# Patient Record
Sex: FEMALE | URBAN - METROPOLITAN AREA
[De-identification: names, ages, dates, MRNs, and addresses within clinical notes are randomized per-mention and may not be internally consistent; named-entity substitution may affect disease eponyms.]

---

## 2018-06-26 LAB — MAMMOGRAM: NORMAL

## 2018-11-07 ENCOUNTER — OFFICE VISIT (OUTPATIENT)
Dept: DERMATOLOGY | Facility: CLINIC | Age: 63
End: 2018-11-07

## 2018-11-07 VITALS — DIASTOLIC BLOOD PRESSURE: 80 MMHG | SYSTOLIC BLOOD PRESSURE: 123 MMHG | HEART RATE: 76 BPM

## 2018-11-07 DIAGNOSIS — L30.9 DERMATITIS: Primary | ICD-10-CM

## 2018-11-07 RX ORDER — LETROZOLE 2.5 MG/1
TABLET, FILM COATED ORAL
COMMUNITY
Start: 2018-10-19

## 2018-11-07 ASSESSMENT — PAIN SCALES - GENERAL: PAINLEVEL: NO PAIN (0)

## 2018-11-07 NOTE — LETTER
11/7/2018       RE: Sahara Whiteside  [address] [facility] [address]     Dear Colleague,    Thank you for referring your patient, Sahara Whiteside, to the [facility] DERMATOLOGY at [facility]. Please see a copy of my visit note below.    [facility] Dermatology Note      Dermatology Problem List:  1. Dermatitis: dermal hypersensitivity per patient report on 3 outside biopsies (attempting to obtain prior biopsy reports). Favor methotrexate (check with patient's oncologist given history of stage III breast cancer) versus nbUVB phototherapy (patient lives in [place]); apremilast as alternative  - Current tx: cetirizine 10 mg twice daily; clobetasol ointment     CC:   Chief Complaint   Patient presents with     Derm Problem     Sahara is here for a body rash mostly on her back          Encounter Date: Nov 7, 2018    History of Present Illness:  Ms. Sahara Whiteside is a 63 year old female who presents as a referral from Dr. Aleida Arellano for evaluation of a rash on her back. This rash first manifested over ten years ago; the patient thinks that it significantly increased in severity around the time of menopause. Since then, she admits that this rash occurs episodically. It's most recent flare was around February 2018. When it flares, raised red areas appear on her skin and remain there for multiple days. These areas are very pruritic, she says. Antihistamines have not helped - another dermatologist gave her cetirizine but this had no benefit. She has had improvement with systemic steroids. She has had TRUE testing - bacitracin was identified as the only allergy. She has had three biopsies in the past; per patient report these demonstrated urticaria and dermal hypersensitivity.    As for other medical problems, the patient admits to a personal history of breast cancer. She is currently on letrozole for maintenance therapy. She has a history of MRSA.     Past  Medical History:   Breast cancer s/p chemo/radiotherapy    Social History:   reports that she has never smoked. She has never used smokeless tobacco.    Family History:  Family History   Problem Relation Age of Onset     Melanoma No family hx of      Skin Cancer No family hx of        Medications:  Current Outpatient Prescriptions   Medication Sig Dispense Refill     letrozole (FEMARA) 2.5 MG tablet        Allergies   Allergen Reactions     Bacitracin Rash     Latex Rash         Review of Systems:  - Constitutional: The patient is feeling generally well. Admits to hot flashes.   - Skin: As above in HPI. No additional skin concerns.  - MSK: no arthralgias or myalgias    Physical exam:  Vitals: /80 (BP Location: Left arm, Patient Position: Sitting, Cuff Size: Adult Regular)  Pulse 76  GEN: This is a well developed, well-nourished female in no acute distress, in a pleasant mood.    SKIN: More phototype 2  - Total skin excluding the undergarment areas was performed. The exam included the head/face, neck, both arms, chest, back, abdomen, both legs, digits and/or nails.   - On the right inframammary space there is an erythematous plaque  - On the right abdomen there are excoriated erythematous papules  - On the right upper back there are lichenified erythematous papules.   - On the lower back there are excoriated papules  - Lichenification around the right clavicle.   - No other lesions of concern on areas examined.     Impression/Plan:  1. 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 Has also trialed oral antihistamines without significant benefit. It would be helpful to review the past 3 biopsy reports - we'll contact the patient's previous providers to get these records. Given the current clinical appearance and prior response to prednisone, we would anticipate some benefit with methotrexate. We also discussed nbUVB phototherapy.  Logistically the latter would be difficult as the patient lives in [place]. Would favor methotrexate but in the context of this patient's history of breast cancer, would need to discuss with her oncologist.     Discuss initiation of methotrexate with patient's oncologist in context of breast cancer.     Discussed nbUVB with the patient - patient cannot due this at this time due to the logistical constraint of commuting out to [place] for these treatments. We discussed the potential of a home unit, but without demonstrated in-clinic effectiveness, may have coverage issues. Also discussed finding outside dermatology provider closer to patient's home who may be able to administer nbUVB.    Apremilast would be a reasonable alternative however coverage may be challenging in this patient. Will pursue if methotrexate and nbUVB are not viable options.    Restart cetirizine; may use topical steroids in the interim while awaiting oncology input    Follow-up 4-6 weeks.       Staff Involved:  Staff/Scribe    Scribe Disclosure  I, Carrillo Gonzales, am serving as a scribe to document services personally performed by Dr. Humberto Bergman MD, based on data collection and the provider's statements to me.     Provider Disclosure:   The documentation recorded by the scribe accurately reflects the services I personally performed and the decisions made by me.    Humberto Bergman MD   of Dermatology  Department of Dermatology  [facility]

## 2018-11-08 PROBLEM — L30.9 DERMATITIS: Status: ACTIVE | Noted: 2018-11-08

## 2018-11-08 NOTE — NURSING NOTE
Dermatology Rooming Note    Sahara Whiteside's goals for this visit include:   Chief Complaint   Patient presents with     Derm Problem     Sahara is here for a body rash mostly on her back      Tiny Mccartney, CMA

## 2018-11-08 NOTE — PATIENT INSTRUCTIONS
Methotrexate - ask oncologist if okay for your rash in context of breast cancer  Narrow-band UVB phototherapy - 2-3 times weekly (look on American Academy of Dermatology website - aad.org)  Cetirizine 10 mg - can take 2 in the morning and 2 at night  Clobetasol - twice daily to affected areas  Vanicream  Fax biopsy reports

## 2018-11-08 NOTE — PROGRESS NOTES
[facility] Dermatology Note      Dermatology Problem List:  1. Dermatitis: dermal hypersensitivity per patient report on 3 outside biopsies (attempting to obtain prior biopsy reports). Favor methotrexate (check with patient's oncologist given history of stage III breast cancer) versus nbUVB phototherapy (patient lives in [place]); apremilast as alternative  - Current tx: cetirizine 10 mg twice daily; clobetasol ointment     CC:   Chief Complaint   Patient presents with     Derm Problem     Sahara is here for a body rash mostly on her back          Encounter Date: Nov 7, 2018    History of Present Illness:  Ms. Sahara Whiteside is a 63 year old female who presents as a referral from Dr. Aleida Arellano for evaluation of a rash on her back. This rash first manifested over ten years ago; the patient thinks that it significantly increased in severity around the time of menopause. Since then, she admits that this rash occurs episodically. It's most recent flare was around February 2018. When it flares, raised red areas appear on her skin and remain there for multiple days. These areas are very pruritic, she says. Antihistamines have not helped - another dermatologist gave her cetirizine but this had no benefit. She has had improvement with systemic steroids. She has had TRUE testing - bacitracin was identified as the only allergy. She has had three biopsies in the past; per patient report these demonstrated urticaria and dermal hypersensitivity.    As for other medical problems, the patient admits to a personal history of breast cancer. She is currently on letrozole for maintenance therapy. She has a history of MRSA.     Past Medical History:   Breast cancer s/p chemo/radiotherapy    Social History:   reports that she has never smoked. She has never used smokeless tobacco.    Family History:  Family History   Problem Relation Age of Onset     Melanoma No family hx of      Skin Cancer No family hx of         Medications:  Current Outpatient Prescriptions   Medication Sig Dispense Refill     letrozole (FEMARA) 2.5 MG tablet        Allergies   Allergen Reactions     Bacitracin Rash     Latex Rash         Review of Systems:  - Constitutional: The patient is feeling generally well. Admits to hot flashes.   - Skin: As above in HPI. No additional skin concerns.  - MSK: no arthralgias or myalgias    Physical exam:  Vitals: /80 (BP Location: Left arm, Patient Position: Sitting, Cuff Size: Adult Regular)  Pulse 76  GEN: This is a well developed, well-nourished female in no acute distress, in a pleasant mood.    SKIN: More phototype 2  - Total skin excluding the undergarment areas was performed. The exam included the head/face, neck, both arms, chest, back, abdomen, both legs, digits and/or nails.   - On the right inframammary space there is an erythematous plaque  - On the right abdomen there are excoriated erythematous papules  - On the right upper back there are lichenified erythematous papules.   - On the lower back there are excoriated papules  - Lichenification around the right clavicle.   - No other lesions of concern on areas examined.     Impression/Plan:  1. 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 Has also trialed oral antihistamines without significant benefit. It would be helpful to review the past 3 biopsy reports - we'll contact the patient's previous providers to get these records. Given the current clinical appearance and prior response to prednisone, we would anticipate some benefit with methotrexate. We also discussed nbUVB phototherapy. Logistically the latter would be difficult as the patient lives in [place]. Would favor methotrexate but in the context of this patient's history of breast cancer, would need to discuss with her oncologist.     Discuss initiation of methotrexate with patient's oncologist in context of  breast cancer.     Discussed nbUVB with the patient - patient cannot due this at this time due to the logistical constraint of commuting out to [place] for these treatments. We discussed the potential of a home unit, but without demonstrated in-clinic effectiveness, may have coverage issues. Also discussed finding outside dermatology provider closer to patient's home who may be able to administer nbUVB.    Apremilast would be a reasonable alternative however coverage may be challenging in this patient. Will pursue if methotrexate and nbUVB are not viable options.    Restart cetirizine; may use topical steroids in the interim while awaiting oncology input    Follow-up 4-6 weeks.       Staff Involved:  Staff/Scribe    Scribe Disclosure  I, Carrillo Gonzales, am serving as a scribe to document services personally performed by Dr. Humberto Bergman MD, based on data collection and the provider's statements to me.     Provider Disclosure:   The documentation recorded by the scribe accurately reflects the services I personally performed and the decisions made by me.    Humberto Bergman MD   of Dermatology  Department of Dermatology  [facility]

## 2018-11-14 ENCOUNTER — HEALTH MAINTENANCE LETTER (OUTPATIENT)
Age: 63
End: 2018-11-14

## 2018-11-16 ENCOUNTER — MYC MEDICAL ADVICE (OUTPATIENT)
Dept: DERMATOLOGY | Facility: CLINIC | Age: 63
End: 2018-11-16

## 2018-11-16 DIAGNOSIS — L30.9 DERMATITIS: Primary | ICD-10-CM

## 2018-11-18 RX ORDER — METHOTREXATE 2.5 MG/1
TABLET ORAL
Qty: 24 TABLET | Refills: 3 | Status: SHIPPED | OUTPATIENT
Start: 2018-11-18 | End: 2019-04-29

## 2018-11-18 NOTE — TELEPHONE ENCOUNTER
Plan to start methotrexate given okay by patient's oncologist. Will obtain baseline labs, then 5 mg test dose, then repeat CBC in 1 week. If okay, increase to 10 mg with repeat CBC in 1 week. If okay, increase to 15 mg with repeat CBC in 1 week, then monthly labs until 3 months, then q3 months thereafter.

## 2018-11-19 ENCOUNTER — TRANSFERRED RECORDS (OUTPATIENT)
Dept: HEALTH INFORMATION MANAGEMENT | Facility: CLINIC | Age: 63
End: 2018-11-19

## 2018-11-26 ENCOUNTER — TELEPHONE (OUTPATIENT)
Dept: DERMATOLOGY | Facility: CLINIC | Age: 63
End: 2018-11-26

## 2018-11-26 NOTE — TELEPHONE ENCOUNTER
Outside labs received and reviewed by Dr. Bergman, labs WNL and gita to increase dose per provider.

## 2018-12-05 ENCOUNTER — OFFICE VISIT (OUTPATIENT)
Dept: DERMATOLOGY | Facility: CLINIC | Age: 63
End: 2018-12-05

## 2018-12-05 VITALS — HEART RATE: 74 BPM | SYSTOLIC BLOOD PRESSURE: 115 MMHG | DIASTOLIC BLOOD PRESSURE: 63 MMHG

## 2018-12-05 DIAGNOSIS — L30.9 DERMATITIS: Primary | ICD-10-CM

## 2018-12-05 DIAGNOSIS — L30.9 DERMATITIS: ICD-10-CM

## 2018-12-05 LAB
ALBUMIN SERPL-MCNC: 3.9 G/DL (ref 3.4–5)
ALP SERPL-CCNC: 104 U/L (ref 40–150)
ALT SERPL W P-5'-P-CCNC: 32 U/L (ref 0–50)
ANION GAP SERPL CALCULATED.3IONS-SCNC: 9 MMOL/L (ref 3–14)
AST SERPL W P-5'-P-CCNC: 32 U/L (ref 0–45)
BASOPHILS # BLD AUTO: 0 10E9/L (ref 0–0.2)
BASOPHILS NFR BLD AUTO: 0.5 %
BILIRUB SERPL-MCNC: 0.9 MG/DL (ref 0.2–1.3)
BUN SERPL-MCNC: 22 MG/DL (ref 7–30)
CALCIUM SERPL-MCNC: 9.2 MG/DL (ref 8.5–10.1)
CHLORIDE SERPL-SCNC: 107 MMOL/L (ref 94–109)
CO2 SERPL-SCNC: 27 MMOL/L (ref 20–32)
CREAT SERPL-MCNC: 0.65 MG/DL (ref 0.52–1.04)
DIFFERENTIAL METHOD BLD: NORMAL
EOSINOPHIL # BLD AUTO: 0.3 10E9/L (ref 0–0.7)
EOSINOPHIL NFR BLD AUTO: 3.9 %
ERYTHROCYTE [DISTWIDTH] IN BLOOD BY AUTOMATED COUNT: 13 % (ref 10–15)
GFR SERPL CREATININE-BSD FRML MDRD: >90 ML/MIN/1.7M2
GLUCOSE SERPL-MCNC: 94 MG/DL (ref 70–99)
HCT VFR BLD AUTO: 41.7 % (ref 35–47)
HGB BLD-MCNC: 13.4 G/DL (ref 11.7–15.7)
IMM GRANULOCYTES # BLD: 0 10E9/L (ref 0–0.4)
IMM GRANULOCYTES NFR BLD: 0.3 %
LYMPHOCYTES # BLD AUTO: 1.4 10E9/L (ref 0.8–5.3)
LYMPHOCYTES NFR BLD AUTO: 17.3 %
MCH RBC QN AUTO: 30.9 PG (ref 26.5–33)
MCHC RBC AUTO-ENTMCNC: 32.1 G/DL (ref 31.5–36.5)
MCV RBC AUTO: 96 FL (ref 78–100)
MONOCYTES # BLD AUTO: 0.6 10E9/L (ref 0–1.3)
MONOCYTES NFR BLD AUTO: 7.5 %
NEUTROPHILS # BLD AUTO: 5.6 10E9/L (ref 1.6–8.3)
NEUTROPHILS NFR BLD AUTO: 70.5 %
NRBC # BLD AUTO: 0 10*3/UL
NRBC BLD AUTO-RTO: 0 /100
PLATELET # BLD AUTO: 294 10E9/L (ref 150–450)
POTASSIUM SERPL-SCNC: 4.3 MMOL/L (ref 3.4–5.3)
PROT SERPL-MCNC: 7.8 G/DL (ref 6.8–8.8)
RBC # BLD AUTO: 4.34 10E12/L (ref 3.8–5.2)
SODIUM SERPL-SCNC: 142 MMOL/L (ref 133–144)
WBC # BLD AUTO: 7.9 10E9/L (ref 4–11)

## 2018-12-05 ASSESSMENT — PAIN SCALES - GENERAL: PAINLEVEL: NO PAIN (0)

## 2018-12-05 NOTE — LETTER
12/5/2018       RE: Sahara Whiteside  [address] [facility] [address]     Dear Colleague,    Thank you for referring your patient, Sahara Whiteside, to the [facility] DERMATOLOGY at [facility]. Please see a copy of my visit note below.    [facility] Dermatology Note      Dermatology Problem List:  1. Dermatitis NOS: current treatment: cetirizine 10 mg twice daily; methotrexate 15 mg weekly; clobetasol ointment; home UVB unit (bought OTC from [employer])     Encounter Date: Dec 5, 2018    CC:  No chief complaint on file.        History of Present Illness:  Ms. Sahara Whiteside is a 63 year old female who presents as a follow-up for dermatitis. The patient was last seen 11/07/18 when she started methotrexate and restarted cetirizine. Today she reports that she has started taking methotrexate and took four pills this past Saturday (12/1/18), and she has not noticed any relief thus far. She voices concern about liver damage from methotrexate, and she states that she purchased a nbUVB lamp from [employer] that has been providing some improvement but incomplete relief of symptoms - she still notes diffuse itching and red rashes on the trunk. The patient voices no other concerns.      Past Medical History:   Patient Active Problem List   Diagnosis     Dermatitis     No past medical history on file.  No past surgical history on file.    Social History:  Patient reports that she has never smoked. She has never used smokeless tobacco.    Family History:  Family History   Problem Relation Age of Onset     Melanoma No family hx of      Skin Cancer No family hx of        Medications:  Current Outpatient Prescriptions   Medication Sig Dispense Refill     letrozole (FEMARA) 2.5 MG tablet        methotrexate sodium 2.5 MG TABS Take 2 tabs once then check labs. If ok, increase to 4 tabs 1 wk later then check labs, if ok increase to 6 tabs 1 wk later then wkly after 24 tablet 3        Allergies   Allergen Reactions     Bacitracin Rash     Latex Rash       Review of Systems:  -As per HPI  -Constitutional: Otherwise feeling well today, in usual state of health.  -HEENT: Patient denies nonhealing oral sores.  -Skin: As above in HPI. No additional skin concerns.    Physical exam:  Vitals: /63 (BP Location: Left arm, Patient Position: Chair)  Pulse 74  GEN: This is a well developed, well-nourished female in no acute distress, in a pleasant mood.    SKIN: Total skin excluding the undergarment areas was performed. The exam included the head/face, neck, both arms, chest, back, abdomen, both legs, digits and/or nails.   -Neymar type 2  -excoriated papules on the lower and upper back   -erythema and lichenification on the lower back and flanks   -erythema and lichenification on upper chest and inframammary space  -No other lesions of concern on areas examined.       Impression/Plan:  1. Eczematous dermatitis: currently using OTC UVB lamps; recently uptitrated to full dose methotrexate but too early to assess clinical response. Extensively discussed treatment options including switching to medical nbUVB rather than using OTC units (unclear dosing, risk of burning, etc) versus continuation of methotrexate. Patient is concerned about potential side effects of methotrexate, but no dermatologists nearby have nbUVB. Discussed calling local dermatology offices near home to see if there are available nbUVB units, then switching to prescription home units in the future, versus continuing methotrexate. At this time, patient prefers to trial short course of methotrexate but will also look into nearby nbUVB treatments.    Labs today: CMP, CBC w/platelet differential      CC Aleida Arellano MD  [facility]  [address] [place] [address] on close of this encounter.  Follow-up in 2 to 3 months, earlier for new or changing lesions.       Staff Involved:  Scribe/Staff    Scribe  Disclosure  I, Carrillo Najjar, am serving as a scribe to document services personally performed by Dr. Humberto Bergman MD, based on data collection and the provider's statements to me.    Over 25 minutes was spent during this visit, including >20 minutes of face-to-face time with the patient counseling and coordinating care including the discussion of diagnosis, natural history, treatment, and prognosis as documented above.    Provider Disclosure:   The documentation recorded by the scribe accurately reflects the services I personally performed and the decisions made by me.    Humberto Bergman MD   of Dermatology  Department of Dermatology  [facility]

## 2018-12-05 NOTE — MR AVS SNAPSHOT
After Visit Summary   12/5/2018    Sahara Whiteside    MRN: [id number]           Patient Information     Date Of Birth          1955        Visit Information        Provider Department      12/5/2018 2:00 PM Humberto Bergman MD [facility] Dermatology        Today's Diagnoses     Dermatitis    -  1       Follow-ups after your visit        Your next 10 appointments already scheduled     Dec 05, 2018  2:30 PM CST   LAB with  LAB   [facility]    [address] [place] [id number] [phone]           Please do not eat 10-12 hours before your appointment if you are coming in fasting for labs on lipids, cholesterol, or glucose (sugar). This does not apply to pregnant women. Water, hot tea and black coffee (with nothing added) are okay. Do not drink other fluids, diet soda or chew gum.            Mar 11, 2019 11:05 AM CDT   (Arrive by 10:50 AM)   Return Visit with Humberto Bergman MD   [facility]    [address] [place] [id number] [phone]              Future tests that were ordered for you today     Open Standing Orders        Priority Remaining Interval Expires Ordered    CBC with platelets differential Routine 5/5 1 WEEK 12/5/2019 12/5/2018    Comprehensive metabolic panel Routine 5/5 1 week 12/5/2019 12/5/2018            Who to contact     Please call your clinic at [phone] to:    Ask questions about your health    Make or cancel appointments    Discuss your medicines    Learn about your test results    Speak to your doctor            Additional Information About Your Visit        MyChart Information     [facility] gives you secure access to your electronic health record. If you see a primary care provider, you can also send messages to your care team and make appointments. If you have questions, please call your primary care clinic.  If you do not  have a primary care provider, please call [phone] and they will assist you.      [address] is an electronic gateway that provides easy, online access to your medical records. With [address], you can request a clinic appointment, read your test results, renew a prescription or communicate with your care team.     To access your existing account, please contact your [facility] or call [phone] for assistance.        Care EveryWhere ID     This is your Care EveryWhere ID. This could be used by other organizations to access your [facility] medical records  [id number]        Your Vitals Were     Pulse                   74            Blood Pressure from Last 3 Encounters:   12/05/18 115/63   11/07/18 123/80    Weight from Last 3 Encounters:   No data found for Wt               Primary Care Provider Office Phone # Fax #    Aleida Arellano [phone]       [facility] [address] [facility] [address]        Equal Access to Services     DIANNE CARSON : Hadii aad ku hadasho Soomaali, waaxda luqadaha, qaybta kaalmada adeegyada, waxay idiin hayaan terrell elena . So [facility] [phone].    ATENCIÓN: Si habla español, tiene a fierro disposición servicios gratuitos de asistencia lingüística. Llame al [phone].    We comply with applicable federal civil rights laws and [place] laws. We do not discriminate on the basis of race, color, national origin, age, disability, sex, sexual orientation, or gender identity.            Thank you!     Thank you for choosing [facility] DERMATOLOGY  for your care. Our goal is always to provide you with excellent care. Hearing back from our patients is one way we can continue to improve our services. Please take a few minutes to complete the written survey that you may receive in the mail after your visit with us. Thank you!             Your Updated Medication List - Protect others around you: Learn how to safely use,  store and throw away your medicines at www.disposemymeds.org.          This list is accurate as of 12/5/18  2:16 PM.  Always use your most recent med list.                   Brand Name Dispense Instructions for use Diagnosis    letrozole 2.5 MG tablet    FEMARA          methotrexate sodium 2.5 MG Tabs     24 tablet    Take 2 tabs once then check labs. If ok, increase to 4 tabs 1 wk later then check labs, if ok increase to 6 tabs 1 wk later then wkly after    Dermatitis

## 2018-12-05 NOTE — PROGRESS NOTES
[facility] Dermatology Note      Dermatology Problem List:  1. Dermatitis NOS: current treatment: cetirizine 10 mg twice daily; methotrexate 15 mg weekly; clobetasol ointment; home UVB unit (bought OTC from [employer])     Encounter Date: Dec 5, 2018    CC:  No chief complaint on file.        History of Present Illness:  Ms. Sahara Whiteside is a 63 year old female who presents as a follow-up for dermatitis. The patient was last seen 11/07/18 when she started methotrexate and restarted cetirizine. Today she reports that she has started taking methotrexate and took four pills this past Saturday (12/1/18), and she has not noticed any relief thus far. She voices concern about liver damage from methotrexate, and she states that she purchased a nbUVB lamp from [employer] that has been providing some improvement but incomplete relief of symptoms - she still notes diffuse itching and red rashes on the trunk. The patient voices no other concerns.      Past Medical History:   Patient Active Problem List   Diagnosis     Dermatitis     No past medical history on file.  No past surgical history on file.    Social History:  Patient reports that she has never smoked. She has never used smokeless tobacco.    Family History:  Family History   Problem Relation Age of Onset     Melanoma No family hx of      Skin Cancer No family hx of        Medications:  Current Outpatient Prescriptions   Medication Sig Dispense Refill     letrozole (FEMARA) 2.5 MG tablet        methotrexate sodium 2.5 MG TABS Take 2 tabs once then check labs. If ok, increase to 4 tabs 1 wk later then check labs, if ok increase to 6 tabs 1 wk later then wkly after 24 tablet 3       Allergies   Allergen Reactions     Bacitracin Rash     Latex Rash       Review of Systems:  -As per HPI  -Constitutional: Otherwise feeling well today, in usual state of health.  -HEENT: Patient denies nonhealing oral sores.  -Skin: As above in HPI. No additional skin  concerns.    Physical exam:  Vitals: /63 (BP Location: Left arm, Patient Position: Chair)  Pulse 74  GEN: This is a well developed, well-nourished female in no acute distress, in a pleasant mood.    SKIN: Total skin excluding the undergarment areas was performed. The exam included the head/face, neck, both arms, chest, back, abdomen, both legs, digits and/or nails.   -Neymar type 2  -excoriated papules on the lower and upper back   -erythema and lichenification on the lower back and flanks   -erythema and lichenification on upper chest and inframammary space  -No other lesions of concern on areas examined.       Impression/Plan:  1. Eczematous dermatitis: currently using OTC UVB lamps; recently uptitrated to full dose methotrexate but too early to assess clinical response. Extensively discussed treatment options including switching to medical nbUVB rather than using OTC units (unclear dosing, risk of burning, etc) versus continuation of methotrexate. Patient is concerned about potential side effects of methotrexate, but no dermatologists nearby have nbUVB. Discussed calling local dermatology offices near home to see if there are available nbUVB units, then switching to prescription home units in the future, versus continuing methotrexate. At this time, patient prefers to trial short course of methotrexate but will also look into nearby nbUVB treatments.    Labs today: CMP, CBC w/platelet differential      CC Aleida Arellano MD  [facility]  [address] [place] [address] on close of this encounter.  Follow-up in 2 to 3 months, earlier for new or changing lesions.       Staff Involved:  Scribe/Staff    Scribe Disclosure  I, Dominic Najjar, am serving as a scribe to document services personally performed by Dr. Humberto Bergman MD, based on data collection and the provider's statements to me.    Over 25 minutes was spent during this visit, including >20 minutes of face-to-face time with the  patient counseling and coordinating care including the discussion of diagnosis, natural history, treatment, and prognosis as documented above.    Provider Disclosure:   The documentation recorded by the scribe accurately reflects the services I personally performed and the decisions made by me.    Humberto Bergman MD   of Dermatology  Department of Dermatology  [facility]

## 2018-12-06 LAB
HBV CORE AB SERPL QL IA: NONREACTIVE
HBV SURFACE AB SERPL IA-ACNC: >1000 M[IU]/ML
HBV SURFACE AG SERPL QL IA: NONREACTIVE
HCV AB SERPL QL IA: NONREACTIVE

## 2018-12-14 DIAGNOSIS — L30.9 DERMATITIS: ICD-10-CM

## 2018-12-14 LAB
BASOPHILS # BLD AUTO: 0 10E9/L (ref 0–0.2)
BASOPHILS NFR BLD AUTO: 0.4 %
DIFFERENTIAL METHOD BLD: NORMAL
EOSINOPHIL # BLD AUTO: 0.2 10E9/L (ref 0–0.7)
EOSINOPHIL NFR BLD AUTO: 2.8 %
ERYTHROCYTE [DISTWIDTH] IN BLOOD BY AUTOMATED COUNT: 13.7 % (ref 10–15)
HCT VFR BLD AUTO: 40.7 % (ref 35–47)
HGB BLD-MCNC: 13.3 G/DL (ref 11.7–15.7)
LYMPHOCYTES # BLD AUTO: 1.2 10E9/L (ref 0.8–5.3)
LYMPHOCYTES NFR BLD AUTO: 14.7 %
MCH RBC QN AUTO: 31.4 PG (ref 26.5–33)
MCHC RBC AUTO-ENTMCNC: 32.7 G/DL (ref 31.5–36.5)
MCV RBC AUTO: 96 FL (ref 78–100)
MONOCYTES # BLD AUTO: 0.7 10E9/L (ref 0–1.3)
MONOCYTES NFR BLD AUTO: 7.9 %
NEUTROPHILS # BLD AUTO: 6.1 10E9/L (ref 1.6–8.3)
NEUTROPHILS NFR BLD AUTO: 74.2 %
PLATELET # BLD AUTO: 278 10E9/L (ref 150–450)
RBC # BLD AUTO: 4.23 10E12/L (ref 3.8–5.2)
WBC # BLD AUTO: 8.2 10E9/L (ref 4–11)

## 2018-12-14 PROCEDURE — 80053 COMPREHEN METABOLIC PANEL: CPT | Performed by: DERMATOLOGY

## 2018-12-14 PROCEDURE — 85025 COMPLETE CBC W/AUTO DIFF WBC: CPT | Performed by: DERMATOLOGY

## 2018-12-14 PROCEDURE — 36415 COLL VENOUS BLD VENIPUNCTURE: CPT | Performed by: DERMATOLOGY

## 2018-12-15 LAB
ALBUMIN SERPL-MCNC: 3.8 G/DL (ref 3.4–5)
ALP SERPL-CCNC: 95 U/L (ref 40–150)
ALT SERPL W P-5'-P-CCNC: 32 U/L (ref 0–50)
ANION GAP SERPL CALCULATED.3IONS-SCNC: 9 MMOL/L (ref 3–14)
AST SERPL W P-5'-P-CCNC: 29 U/L (ref 0–45)
BILIRUB SERPL-MCNC: 0.7 MG/DL (ref 0.2–1.3)
BUN SERPL-MCNC: 32 MG/DL (ref 7–30)
CALCIUM SERPL-MCNC: 9.3 MG/DL (ref 8.5–10.1)
CHLORIDE SERPL-SCNC: 105 MMOL/L (ref 94–109)
CO2 SERPL-SCNC: 27 MMOL/L (ref 20–32)
CREAT SERPL-MCNC: 0.65 MG/DL (ref 0.52–1.04)
GFR SERPL CREATININE-BSD FRML MDRD: >90 ML/MIN/1.7M2
GLUCOSE SERPL-MCNC: 98 MG/DL (ref 70–99)
POTASSIUM SERPL-SCNC: 4.2 MMOL/L (ref 3.4–5.3)
PROT SERPL-MCNC: 7.3 G/DL (ref 6.8–8.8)
SODIUM SERPL-SCNC: 141 MMOL/L (ref 133–144)

## 2019-02-18 ENCOUNTER — DOCUMENTATION ONLY (OUTPATIENT)
Dept: CARE COORDINATION | Facility: CLINIC | Age: 64
End: 2019-02-18

## 2019-04-01 ENCOUNTER — MYC REFILL (OUTPATIENT)
Dept: DERMATOLOGY | Facility: CLINIC | Age: 64
End: 2019-04-01

## 2019-04-01 DIAGNOSIS — Z51.81 MEDICATION MONITORING ENCOUNTER: Primary | ICD-10-CM

## 2019-04-01 DIAGNOSIS — L30.9 DERMATITIS: ICD-10-CM

## 2019-04-01 NOTE — TELEPHONE ENCOUNTER
methotrexate sodium 2.5 MG TABS       Last Written Prescription Date:  11-18-18  Last Fill Quantity: 24,   # refills: 3  Last Office Visit: 12-5-18  Future Office visit:  None  Scheduling has been notified to contact the pt for appointment.      CBC RESULTS:   Recent Labs   Lab Test 12/14/18  1415   WBC 8.2   RBC 4.23   HGB 13.3   HCT 40.7   MCV 96   MCH 31.4   MCHC 32.7   RDW 13.7          Creatinine   Date Value Ref Range Status   12/14/2018 0.65 0.52 - 1.04 mg/dL Final   ]    Liver Function Studies -   Recent Labs   Lab Test 12/14/18  1415   PROTTOTAL 7.3   ALBUMIN 3.8   BILITOTAL 0.7   ALKPHOS 95   AST 29   ALT 32       Kathleen M Doege RN

## 2019-04-02 RX ORDER — METHOTREXATE 2.5 MG/1
15 TABLET ORAL WEEKLY
Qty: 70 TABLET | Refills: 3 | OUTPATIENT
Start: 2019-04-02 | End: 2019-06-19

## 2019-04-03 NOTE — TELEPHONE ENCOUNTER
Labs were reviewed in our system and CareEverywhere and have not been done in several months. I have placed orders for CBC and CMP which should be completed prior to refill authorization. Please notify pt.     Becka Valenzuela MD  Dermatology Resident, PGY-3  Pager [phone]  RYAN

## 2019-04-03 NOTE — TELEPHONE ENCOUNTER
I called the patient and she was notified that she needed to have labs done before we could refill her medication. She will go to the [facility] in [place] to have these drawn.   Jackeline Gardner, RUFUS

## 2019-04-08 DIAGNOSIS — L30.9 DERMATITIS: ICD-10-CM

## 2019-04-08 LAB
ALBUMIN SERPL-MCNC: 3.8 G/DL (ref 3.4–5)
ALP SERPL-CCNC: 99 U/L (ref 40–150)
ALT SERPL W P-5'-P-CCNC: 23 U/L (ref 0–50)
ANION GAP SERPL CALCULATED.3IONS-SCNC: 10 MMOL/L (ref 3–14)
AST SERPL W P-5'-P-CCNC: 27 U/L (ref 0–45)
BASOPHILS # BLD AUTO: 0 10E9/L (ref 0–0.2)
BASOPHILS NFR BLD AUTO: 0.8 %
BILIRUB SERPL-MCNC: 1.2 MG/DL (ref 0.2–1.3)
BUN SERPL-MCNC: 25 MG/DL (ref 7–30)
CALCIUM SERPL-MCNC: 9.2 MG/DL (ref 8.5–10.1)
CHLORIDE SERPL-SCNC: 109 MMOL/L (ref 94–109)
CO2 SERPL-SCNC: 24 MMOL/L (ref 20–32)
CREAT SERPL-MCNC: 0.72 MG/DL (ref 0.52–1.04)
DIFFERENTIAL METHOD BLD: NORMAL
EOSINOPHIL # BLD AUTO: 0.2 10E9/L (ref 0–0.7)
EOSINOPHIL NFR BLD AUTO: 3.3 %
ERYTHROCYTE [DISTWIDTH] IN BLOOD BY AUTOMATED COUNT: 13.1 % (ref 10–15)
GFR SERPL CREATININE-BSD FRML MDRD: 89 ML/MIN/{1.73_M2}
GLUCOSE SERPL-MCNC: 94 MG/DL (ref 70–99)
HCT VFR BLD AUTO: 41.7 % (ref 35–47)
HGB BLD-MCNC: 13.9 G/DL (ref 11.7–15.7)
LYMPHOCYTES # BLD AUTO: 1.3 10E9/L (ref 0.8–5.3)
LYMPHOCYTES NFR BLD AUTO: 25.2 %
MCH RBC QN AUTO: 32.2 PG (ref 26.5–33)
MCHC RBC AUTO-ENTMCNC: 33.3 G/DL (ref 31.5–36.5)
MCV RBC AUTO: 97 FL (ref 78–100)
MONOCYTES # BLD AUTO: 0.4 10E9/L (ref 0–1.3)
MONOCYTES NFR BLD AUTO: 8.4 %
NEUTROPHILS # BLD AUTO: 3.2 10E9/L (ref 1.6–8.3)
NEUTROPHILS NFR BLD AUTO: 62.3 %
PLATELET # BLD AUTO: 270 10E9/L (ref 150–450)
POTASSIUM SERPL-SCNC: 4.1 MMOL/L (ref 3.4–5.3)
PROT SERPL-MCNC: 7.3 G/DL (ref 6.8–8.8)
RBC # BLD AUTO: 4.32 10E12/L (ref 3.8–5.2)
SODIUM SERPL-SCNC: 143 MMOL/L (ref 133–144)
WBC # BLD AUTO: 5.1 10E9/L (ref 4–11)

## 2019-04-08 PROCEDURE — 85025 COMPLETE CBC W/AUTO DIFF WBC: CPT | Performed by: DERMATOLOGY

## 2019-04-08 PROCEDURE — 36415 COLL VENOUS BLD VENIPUNCTURE: CPT | Performed by: DERMATOLOGY

## 2019-04-08 PROCEDURE — 80053 COMPREHEN METABOLIC PANEL: CPT | Performed by: DERMATOLOGY

## 2019-04-29 ENCOUNTER — OFFICE VISIT (OUTPATIENT)
Dept: DERMATOLOGY | Facility: CLINIC | Age: 64
End: 2019-04-29
Payer: COMMERCIAL

## 2019-04-29 ENCOUNTER — TELEPHONE (OUTPATIENT)
Dept: DERMATOLOGY | Facility: CLINIC | Age: 64
End: 2019-04-29

## 2019-04-29 VITALS — SYSTOLIC BLOOD PRESSURE: 112 MMHG | HEART RATE: 80 BPM | DIASTOLIC BLOOD PRESSURE: 69 MMHG

## 2019-04-29 DIAGNOSIS — L30.9 DERMATITIS: ICD-10-CM

## 2019-04-29 DIAGNOSIS — L30.9 DERMATITIS: Primary | ICD-10-CM

## 2019-04-29 DIAGNOSIS — Z79.899 ENCOUNTER FOR LONG-TERM (CURRENT) USE OF HIGH-RISK MEDICATION: ICD-10-CM

## 2019-04-29 RX ORDER — METHOTREXATE 2.5 MG/1
12.5 TABLET ORAL WEEKLY
Qty: 25 TABLET | Refills: 3 | Status: SHIPPED | OUTPATIENT
Start: 2019-04-29 | End: 2019-07-10

## 2019-04-29 RX ORDER — METHOTREXATE 2.5 MG/1
12.5 TABLET ORAL WEEKLY
Qty: 24 TABLET | Refills: 3 | Status: SHIPPED | OUTPATIENT
Start: 2019-04-29 | End: 2019-04-29

## 2019-04-29 ASSESSMENT — PAIN SCALES - GENERAL: PAINLEVEL: NO PAIN (0)

## 2019-04-29 NOTE — TELEPHONE ENCOUNTER
[facility] Call Center    Phone Message    May a detailed message be left on voicemail: yes    Reason for Call: Medication Question or concern regarding medication   Prescription Clarification  Name of Medication: methotrexate sodium 2.5 MG TABS  Prescribing Provider: Dr. Bergman   Pharmacy: [facility] [address]     What on the order needs clarification? Quantity needs to be changed to multiple of 5, as pt's dose was increased from 4 to 5 tabs. Quantity on order is 24, verbal is ok.          Action Taken: Message routed to:  [facility] ([facility] Derm

## 2019-04-29 NOTE — NURSING NOTE
Dermatology Rooming Note    Sahara Whiteside's goals for this visit include:   Chief Complaint   Patient presents with     Derm Problem     Sahara is here today for a follow up on Eczematous dermatitis. She says it is great.     Felisha Linares CMA

## 2019-04-29 NOTE — PROGRESS NOTES
[facility] Dermatology Note      Dermatology Problem List:  1. Eczematous/urticaria dermatitis: much improved with methotrexate; recurred upon discontinuation  Current tx: methotrexate 12.5 mg every 10 days  Previous tx: cetirizine 10 mg twice daily; clobetasol ointment; home nbUVB unit       Encounter Date: Apr 29, 2019    CC:  Chief Complaint   Patient presents with     Derm Problem     Sahara is here today for a follow up on Eczematous dermatitis. She says it is great.         History of Present Illness:  Ms. Sahara Whiteside is a 63 year old female who presents as a follow-up for eczematous/urticaria dermatitis. The patient was last seen 12/5/19 when methotrexate was initiated. She is currently taking methotrexate 15 mg every 10 days. She has had complete resolution of her red rash without any residual itching. She tried coming off of the methotrexate briefly, but the rash came back almost immediately. She is curious about what the lowest dosage could be to keep her rash under control.     Past Medical History:   Patient Active Problem List   Diagnosis     Dermatitis     No past medical history on file.  No past surgical history on file.    Social History:  Patient reports that she has never smoked. She has never used smokeless tobacco.    Family History:  Family History   Problem Relation Age of Onset     Melanoma No family hx of      Skin Cancer No family hx of        Medications:  Current Outpatient Medications   Medication Sig Dispense Refill     letrozole (FEMARA) 2.5 MG tablet        methotrexate sodium 2.5 MG TABS Take 2 tabs once then check labs. If ok, increase to 4 tabs 1 wk later then check labs, if ok increase to 6 tabs 1 wk later then wkly after (Patient not taking: Reported on 4/29/2019) 24 tablet 3     Allergies   Allergen Reactions     Bacitracin Rash     Latex Rash         Review of Systems:  -Constitutional: Otherwise feeling well today, in usual state of health. No fever,  chills, or headache.   -Skin: As above in HPI. No additional skin concerns.    Physical exam:  Vitals: /69 (BP Location: Left arm, Patient Position: Sitting, Cuff Size: Adult Regular)   Pulse 80   GEN: This is a well developed, well-nourished female in no acute distress, in a pleasant mood.    SKIN: Waist-up skin, which includes the head/face, neck, both arms, chest, back, abdomen, digits and/or nails was examined.  -Scar right upper chest and mid central chest.  -No erythema, scaling, excoriations, or lichenification noted.  -No other lesions of concern on areas examined.     Impression/Plan:  1. Eczematous/urticarial dermatitis-currently well controlled with methotrexate. We discussed a slow taper of the methotrexate to see how she responds.     Decrease methotrexate to 12.5 mg every 10 days for 6 weeks, then decrease to 10 mg for 6 weeks, then 7.5 mg for six weeks.    Return to clinic if rash returns prior to next check up.    Labs due in 3 months.      CC [place] [address] on close of this encounter.  Follow-up in 3-4 months, earlier for new or changing lesions.     Staff Involved:  I,Anamika Hurley, MS4, saw and examined the patient in the presence of Dr. Madalyn MD.    Staff Physician:  I was present with the medical student who participated in the service and in the documentation of the note. I have verified the history and personally performed the physical exam and medical decision making. I edited the assessment and plan of care as documented in the note.     Humberto Bergman MD   of Dermatology  Department of Dermatology  [facility]

## 2019-04-29 NOTE — LETTER
4/29/2019       RE: Sahara Whiteside  [address] [address]     Dear Colleague,    Thank you for referring your patient, Sahara Whiteside, to the [facility] DERMATOLOGY at [facility]. Please see a copy of my visit note below.    [facility] Dermatology Note      Dermatology Problem List:  1.  Eczematous/urticaria dermatitis: much improved with methotrexate; recurred upon discontinuation  Current tx: methotrexate 12.5 mg every 10 days  Previous tx: cetirizine 10 mg twice daily; clobetasol ointment; home nbUVB unit       Encounter Date: Apr 29, 2019    CC:  Chief Complaint   Patient presents with     Derm Problem     Sahara is here today for a follow up on Eczematous dermatitis. She says it is great.         History of Present Illness:  Ms. Sahara Whiteside is a 63 year old female who presents as a follow-up for eczematous/urticaria dermatitis. The patient was last seen 12/5/19 when methotrexate was initiated. She is currently taking methotrexate 15 mg every 10 days. She has had complete resolution of her red rash without any residual itching. She tried coming off of the methotrexate briefly, but the rash came back almost immediately. She is curious about what the lowest dosage could be to keep her rash under control.     Past Medical History:   Patient Active Problem List   Diagnosis     Dermatitis     No past medical history on file.  No past surgical history on file.    Social History:  Patient reports that she has never smoked. She has never used smokeless tobacco.    Family History:  Family History   Problem Relation Age of Onset     Melanoma No family hx of      Skin Cancer No family hx of        Medications:  Current Outpatient Medications   Medication Sig Dispense Refill     letrozole (FEMARA) 2.5 MG tablet        methotrexate sodium 2.5 MG TABS Take 2 tabs once then check labs. If ok, increase to 4 tabs 1 wk later then check labs, if ok  increase to 6 tabs 1 wk later then wkly after (Patient not taking: Reported on 4/29/2019) 24 tablet 3     Allergies   Allergen Reactions     Bacitracin Rash     Latex Rash         Review of Systems:  -Constitutional: Otherwise feeling well today, in usual state of health. No fever, chills, or headache.   -Skin: As above in HPI. No additional skin concerns.    Physical exam:  Vitals: /69 (BP Location: Left arm, Patient Position: Sitting, Cuff Size: Adult Regular)   Pulse 80   GEN: This is a well developed, well-nourished female in no acute distress, in a pleasant mood.    SKIN: Waist-up skin, which includes the head/face, neck, both arms, chest, back, abdomen, digits and/or nails was examined.  -Scar right upper chest and mid central chest.  -No erythema, scaling, excoriations, or lichenification noted.  -No other lesions of concern on areas examined.     Impression/Plan:  1. Eczematous/urticarial dermatitis-currently well controlled with methotrexate. We discussed a slow taper of the methotrexate to see how she responds.     Decrease methotrexate to 12.5 mg every 10 days for 6 weeks, then decrease to 10 mg for 6 weeks, then 7.5 mg for six weeks.    Return to clinic if rash returns prior to next check up.    Labs due in 3 months.      CC Aleida Arellano MD  [facility]  [address] [place] [address] on close of this encounter.  Follow-up in 3-4 months, earlier for new or changing lesions.     Staff Involved:  I,Anamika Hurley, MS4, saw and examined the patient in the presence of Dr. Madalyn MD.    Staff Physician:  I was present with the medical student who participated in the service and in the documentation of the note. I have verified the history and personally performed the physical exam and medical decision making. I edited the assessment and plan of care as documented in the note.       Again, thank you for allowing me to participate in the care of your patient.       Sincerely,    Humberto Bergman MD

## 2019-07-10 DIAGNOSIS — L30.9 DERMATITIS: Primary | ICD-10-CM

## 2019-07-10 RX ORDER — METHOTREXATE 2.5 MG/1
15 TABLET ORAL WEEKLY
Qty: 30 TABLET | Refills: 3 | Status: SHIPPED | OUTPATIENT
Start: 2019-07-10 | End: 2020-02-26

## 2019-07-22 DIAGNOSIS — Z79.899 ENCOUNTER FOR LONG-TERM (CURRENT) USE OF HIGH-RISK MEDICATION: ICD-10-CM

## 2019-07-22 LAB
ALBUMIN SERPL-MCNC: 3.7 G/DL (ref 3.4–5)
ALP SERPL-CCNC: 85 U/L (ref 40–150)
ALT SERPL W P-5'-P-CCNC: 27 U/L (ref 0–50)
ANION GAP SERPL CALCULATED.3IONS-SCNC: 10 MMOL/L (ref 3–14)
AST SERPL W P-5'-P-CCNC: 28 U/L (ref 0–45)
BASOPHILS # BLD AUTO: 0 10E9/L (ref 0–0.2)
BASOPHILS NFR BLD AUTO: 0.3 %
BILIRUB SERPL-MCNC: 1 MG/DL (ref 0.2–1.3)
BUN SERPL-MCNC: 23 MG/DL (ref 7–30)
CALCIUM SERPL-MCNC: 8.8 MG/DL (ref 8.5–10.1)
CHLORIDE SERPL-SCNC: 110 MMOL/L (ref 94–109)
CO2 SERPL-SCNC: 22 MMOL/L (ref 20–32)
CREAT SERPL-MCNC: 0.69 MG/DL (ref 0.52–1.04)
DIFFERENTIAL METHOD BLD: NORMAL
EOSINOPHIL # BLD AUTO: 0.3 10E9/L (ref 0–0.7)
EOSINOPHIL NFR BLD AUTO: 5 %
ERYTHROCYTE [DISTWIDTH] IN BLOOD BY AUTOMATED COUNT: 13.6 % (ref 10–15)
GFR SERPL CREATININE-BSD FRML MDRD: >90 ML/MIN/{1.73_M2}
GLUCOSE SERPL-MCNC: 78 MG/DL (ref 70–99)
HCT VFR BLD AUTO: 40.6 % (ref 35–47)
HGB BLD-MCNC: 13.7 G/DL (ref 11.7–15.7)
LYMPHOCYTES # BLD AUTO: 0.8 10E9/L (ref 0.8–5.3)
LYMPHOCYTES NFR BLD AUTO: 13.4 %
MCH RBC QN AUTO: 32.5 PG (ref 26.5–33)
MCHC RBC AUTO-ENTMCNC: 33.7 G/DL (ref 31.5–36.5)
MCV RBC AUTO: 96 FL (ref 78–100)
MONOCYTES # BLD AUTO: 0.5 10E9/L (ref 0–1.3)
MONOCYTES NFR BLD AUTO: 8.5 %
NEUTROPHILS # BLD AUTO: 4.6 10E9/L (ref 1.6–8.3)
NEUTROPHILS NFR BLD AUTO: 72.8 %
PLATELET # BLD AUTO: 253 10E9/L (ref 150–450)
POTASSIUM SERPL-SCNC: 4.4 MMOL/L (ref 3.4–5.3)
PROT SERPL-MCNC: 7.2 G/DL (ref 6.8–8.8)
RBC # BLD AUTO: 4.22 10E12/L (ref 3.8–5.2)
SODIUM SERPL-SCNC: 142 MMOL/L (ref 133–144)
WBC # BLD AUTO: 6.3 10E9/L (ref 4–11)

## 2019-07-22 PROCEDURE — 36415 COLL VENOUS BLD VENIPUNCTURE: CPT | Performed by: DERMATOLOGY

## 2019-07-22 PROCEDURE — 80053 COMPREHEN METABOLIC PANEL: CPT | Performed by: DERMATOLOGY

## 2019-07-22 PROCEDURE — 85025 COMPLETE CBC W/AUTO DIFF WBC: CPT | Performed by: DERMATOLOGY

## 2019-07-29 ENCOUNTER — OFFICE VISIT (OUTPATIENT)
Dept: DERMATOLOGY | Facility: CLINIC | Age: 64
End: 2019-07-29
Payer: COMMERCIAL

## 2019-07-29 VITALS — SYSTOLIC BLOOD PRESSURE: 118 MMHG | HEART RATE: 70 BPM | DIASTOLIC BLOOD PRESSURE: 73 MMHG

## 2019-07-29 DIAGNOSIS — L30.9 DERMATITIS: Primary | ICD-10-CM

## 2019-07-29 ASSESSMENT — PAIN SCALES - GENERAL: PAINLEVEL: NO PAIN (0)

## 2019-07-29 NOTE — PATIENT INSTRUCTIONS
[facility]  [facility]  [place]

## 2019-07-29 NOTE — LETTER
7/29/2019       RE: Sahara Whiteside  [address] [address]     Dear Colleague,    Thank you for referring your patient, Sahara Whiteside, to the [facility] DERMATOLOGY at [facility]. Please see a copy of my visit note below.    [facility] Dermatology Note      Dermatology Problem List:  1. 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 She says she has seen improvement         History of Present Illness:  Ms. Sahara Whiteside is a 63 year old female who presents as a follow-up for eczematous/urticaria dermatitis on methotrexate. In the past, the patient notes that her lesions have predominantly been on her back with some on her abdomen. The patient was last seen 4/29/2019 when she had been able to decrease her methotrexate dose to 12.5 mg every 10 days. At that time, we had discussed that the patient prefers to be on a lower dose of methotrexate when she can while still controlling her symptoms. Since her visit on 4/29/2019, she called asking to increase the methotrexate dose again to 6 pills per week (15 mg) since she felt she was no longer well controlled in the hot weather with increased sweating and had noticed a new lesion on her back. Since increasing, she notes she has been well-controlled without break-through rash.  She denies fever, chills, night sweats, GI upset, or other constitutional symptoms today.    Past Medical History:   Patient Active Problem List   Diagnosis     Dermatitis     History reviewed. No pertinent past medical history.  History reviewed. No pertinent surgical  history.    Social History: Works at her [employer], spending lots of time outside in the sun; uses SPF 45 multiple times per day.  Patient reports that she has never smoked. She has never used smokeless tobacco.    Family History:  Family History   Problem Relation Age of Onset     Melanoma No family hx of      Skin Cancer No family hx of        Medications:  Current Outpatient Medications   Medication Sig Dispense Refill     letrozole (FEMARA) 2.5 MG tablet        methotrexate sodium 2.5 MG TABS Take 6 tablets (15 mg) by mouth once a week 30 tablet 3     Allergies   Allergen Reactions     Bacitracin Rash     Latex Rash         Review of Systems:  -As per HPI, Const: Denies fevers, chills or changes in weight.   -Constitutional: Otherwise feeling well today, in usual state of health.  -HEENT: Patient denies nonhealing oral sores.  -Skin: As above in HPI. No additional skin concerns.    Physical exam:  Vitals: /73 (BP Location: Left arm, Patient Position: Sitting, Cuff Size: Adult Regular)   Pulse 70   GEN: This is a well developed, well-nourished female in no acute distress, in a pleasant mood.    SKIN: Waist-up skin, which includes the head/face, neck, both arms, chest, back, and abdomen, was examined.  -No erythematous lesions noted today.  -Scar present on upper and mid chest.  -No other lesions of concern on areas examined.     Labs:    CBC RESULTS:   Recent Labs   Lab Test 07/22/19  0939   WBC 6.3   RBC 4.22   HGB 13.7   HCT 40.6   MCV 96   MCH 32.5   MCHC 33.7   RDW 13.6        Last Comprehensive Metabolic Panel:  Sodium   Date Value Ref Range Status   07/22/2019 142 133 - 144 mmol/L Final     Potassium   Date Value Ref Range Status   07/22/2019 4.4 3.4 - 5.3 mmol/L Final     Chloride   Date Value Ref Range Status   07/22/2019 110 (H) 94 - 109 mmol/L Final     Carbon Dioxide   Date Value Ref Range Status   07/22/2019 22 20 - 32 mmol/L Final     Anion Gap   Date Value Ref Range Status   07/22/2019  10 3 - 14 mmol/L Final     Glucose   Date Value Ref Range Status   07/22/2019 78 70 - 99 mg/dL Final     Urea Nitrogen   Date Value Ref Range Status   07/22/2019 23 7 - 30 mg/dL Final     Creatinine   Date Value Ref Range Status   07/22/2019 0.69 0.52 - 1.04 mg/dL Final     GFR Estimate   Date Value Ref Range Status   07/22/2019 >90 >60 mL/min/[1.73_m2] Final     Comment:     Non  GFR Calc  Starting 12/18/2018, serum creatinine based estimated GFR (eGFR) will be   calculated using the Chronic Kidney Disease Epidemiology Collaboration   (CKD-EPI) equation.       Calcium   Date Value Ref Range Status   07/22/2019 8.8 8.5 - 10.1 mg/dL Final     Bilirubin Total   Date Value Ref Range Status   07/22/2019 1.0 0.2 - 1.3 mg/dL Final     Alkaline Phosphatase   Date Value Ref Range Status   07/22/2019 85 40 - 150 U/L Final     ALT   Date Value Ref Range Status   07/22/2019 27 0 - 50 U/L Final     AST   Date Value Ref Range Status   07/22/2019 28 0 - 45 U/L Final     Impression/Plan:  1. Eczematous/urticarial dermatitis: absent on exam today    Continue methotrexate 15 mg every 7 days.    Counseled patient that we can again consider tapering the dose down in cooler months.    Counseled patient that labs checked 7/22/2019 were unremarkable.     Since heat has been a trigger and the patient works outside often, counseled the patient about addition of sun-protective clothing to her regimen.    Will check labs closer to home and follow-up in clinic in 6 months, at which point we can talk about decreasing the methotrexate dose.    2.    Photoprotection: Discussed sun protective behaviors including OTC spf 30+ sunscreen use, upf clothing, and sun avoidance strategies.    CC Aleida Arellano MD  [facility]  [address] [place] [address] on close of this encounter.  Follow-up in 6 months, earlier for new or changing lesions.     Staff Involved:  Evelyn LAKHANI, MS4, saw and examined the  patient in the presence of Dr. Humberto Bergman.    Staff Physician:  I was present with the medical student who participated in the service and in the documentation of the note. I have verified the history and personally performed the physical exam and medical decision making. I edited the assessment and plan of care as documented in the note.     Humberto Bergman MD   of Dermatology  Department of Dermatology  [facility]

## 2019-07-29 NOTE — PROGRESS NOTES
[facility] Dermatology Note      Dermatology Problem List:  1. 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 She says she has seen improvement         History of Present Illness:  Ms. Sahara Whiteside is a 63 year old female who presents as a follow-up for eczematous/urticaria dermatitis on methotrexate. In the past, the patient notes that her lesions have predominantly been on her back with some on her abdomen. The patient was last seen 4/29/2019 when she had been able to decrease her methotrexate dose to 12.5 mg every 10 days. At that time, we had discussed that the patient prefers to be on a lower dose of methotrexate when she can while still controlling her symptoms. Since her visit on 4/29/2019, she called asking to increase the methotrexate dose again to 6 pills per week (15 mg) since she felt she was no longer well controlled in the hot weather with increased sweating and had noticed a new lesion on her back. Since increasing, she notes she has been well-controlled without break-through rash.  She denies fever, chills, night sweats, GI upset, or other constitutional symptoms today.    Past Medical History:   Patient Active Problem List   Diagnosis     Dermatitis     History reviewed. No pertinent past medical history.  History reviewed. No pertinent surgical history.    Social History: Works at her [employer], spending lots of time outside in the sun; uses SPF 45 multiple times per day.  Patient reports that she has never smoked. She has never used smokeless tobacco.    Family History:  Family History   Problem Relation Age of Onset     Melanoma No  family hx of      Skin Cancer No family hx of        Medications:  Current Outpatient Medications   Medication Sig Dispense Refill     letrozole (FEMARA) 2.5 MG tablet        methotrexate sodium 2.5 MG TABS Take 6 tablets (15 mg) by mouth once a week 30 tablet 3     Allergies   Allergen Reactions     Bacitracin Rash     Latex Rash         Review of Systems:  -As per HPI, Const: Denies fevers, chills or changes in weight.   -Constitutional: Otherwise feeling well today, in usual state of health.  -HEENT: Patient denies nonhealing oral sores.  -Skin: As above in HPI. No additional skin concerns.    Physical exam:  Vitals: /73 (BP Location: Left arm, Patient Position: Sitting, Cuff Size: Adult Regular)   Pulse 70   GEN: This is a well developed, well-nourished female in no acute distress, in a pleasant mood.    SKIN: Waist-up skin, which includes the head/face, neck, both arms, chest, back, and abdomen, was examined.  -No erythematous lesions noted today.  -Scar present on upper and mid chest.  -No other lesions of concern on areas examined.     Labs:    CBC RESULTS:   Recent Labs   Lab Test 07/22/19  0939   WBC 6.3   RBC 4.22   HGB 13.7   HCT 40.6   MCV 96   MCH 32.5   MCHC 33.7   RDW 13.6        Last Comprehensive Metabolic Panel:  Sodium   Date Value Ref Range Status   07/22/2019 142 133 - 144 mmol/L Final     Potassium   Date Value Ref Range Status   07/22/2019 4.4 3.4 - 5.3 mmol/L Final     Chloride   Date Value Ref Range Status   07/22/2019 110 (H) 94 - 109 mmol/L Final     Carbon Dioxide   Date Value Ref Range Status   07/22/2019 22 20 - 32 mmol/L Final     Anion Gap   Date Value Ref Range Status   07/22/2019 10 3 - 14 mmol/L Final     Glucose   Date Value Ref Range Status   07/22/2019 78 70 - 99 mg/dL Final     Urea Nitrogen   Date Value Ref Range Status   07/22/2019 23 7 - 30 mg/dL Final     Creatinine   Date Value Ref Range Status   07/22/2019 0.69 0.52 - 1.04 mg/dL Final     GFR Estimate   Date  Value Ref Range Status   07/22/2019 >90 >60 mL/min/[1.73_m2] Final     Comment:     Non  GFR Calc  Starting 12/18/2018, serum creatinine based estimated GFR (eGFR) will be   calculated using the Chronic Kidney Disease Epidemiology Collaboration   (CKD-EPI) equation.       Calcium   Date Value Ref Range Status   07/22/2019 8.8 8.5 - 10.1 mg/dL Final     Bilirubin Total   Date Value Ref Range Status   07/22/2019 1.0 0.2 - 1.3 mg/dL Final     Alkaline Phosphatase   Date Value Ref Range Status   07/22/2019 85 40 - 150 U/L Final     ALT   Date Value Ref Range Status   07/22/2019 27 0 - 50 U/L Final     AST   Date Value Ref Range Status   07/22/2019 28 0 - 45 U/L Final           Impression/Plan:  1. Eczematous/urticarial dermatitis: absent on exam today    Continue methotrexate 15 mg every 7 days.    Counseled patient that we can again consider tapering the dose down in cooler months.    Counseled patient that labs checked 7/22/2019 were unremarkable.     Since heat has been a trigger and the patient works outside often, counseled the patient about addition of sun-protective clothing to her regimen.    Will check labs closer to home and follow-up in clinic in 6 months, at which point we can talk about decreasing the methotrexate dose.    2.    Photoprotection: Discussed sun protective behaviors including OTC spf 30+ sunscreen use, upf clothing, and sun avoidance strategies.    CC Aleida Arellano MD  [facility]  [address] [place] [address] on close of this encounter.  Follow-up in 6 months, earlier for new or changing lesions.     Staff Involved:  I, Evelyn De Jesus, MS4, saw and examined the patient in the presence of Dr. Humberto Bergman.    Staff Physician:  I was present with the medical student who participated in the service and in the documentation of the note. I have verified the history and personally performed the physical exam and medical decision making. I edited the  assessment and plan of care as documented in the note.     Humberto Bergman MD   of Dermatology  Department of Dermatology  [facility]3]

## 2019-07-29 NOTE — NURSING NOTE
Dermatology Rooming Note    Sahara Whiteside's goals for this visit include:   Chief Complaint   Patient presents with     Derm Problem     Sahara is here today for a recheck of dermatitis. She says she has seen improvement     Felisha Linares CMA

## 2020-02-15 ENCOUNTER — MYC MEDICAL ADVICE (OUTPATIENT)
Dept: DERMATOLOGY | Facility: CLINIC | Age: 65
End: 2020-02-15

## 2020-02-15 DIAGNOSIS — L30.9 DERMATITIS: Primary | ICD-10-CM

## 2020-02-19 DIAGNOSIS — Z79.899 ENCOUNTER FOR LONG-TERM (CURRENT) USE OF HIGH-RISK MEDICATION: ICD-10-CM

## 2020-02-19 LAB
BASOPHILS # BLD AUTO: 0 10E9/L (ref 0–0.2)
BASOPHILS NFR BLD AUTO: 0.3 %
DIFFERENTIAL METHOD BLD: NORMAL
EOSINOPHIL # BLD AUTO: 0.3 10E9/L (ref 0–0.7)
EOSINOPHIL NFR BLD AUTO: 3.5 %
ERYTHROCYTE [DISTWIDTH] IN BLOOD BY AUTOMATED COUNT: 14.1 % (ref 10–15)
HCT VFR BLD AUTO: 40.5 % (ref 35–47)
HGB BLD-MCNC: 13.2 G/DL (ref 11.7–15.7)
LYMPHOCYTES # BLD AUTO: 1.8 10E9/L (ref 0.8–5.3)
LYMPHOCYTES NFR BLD AUTO: 20 %
MCH RBC QN AUTO: 31.1 PG (ref 26.5–33)
MCHC RBC AUTO-ENTMCNC: 32.6 G/DL (ref 31.5–36.5)
MCV RBC AUTO: 95 FL (ref 78–100)
MONOCYTES # BLD AUTO: 0.9 10E9/L (ref 0–1.3)
MONOCYTES NFR BLD AUTO: 10 %
NEUTROPHILS # BLD AUTO: 6 10E9/L (ref 1.6–8.3)
NEUTROPHILS NFR BLD AUTO: 66.2 %
PLATELET # BLD AUTO: 256 10E9/L (ref 150–450)
RBC # BLD AUTO: 4.25 10E12/L (ref 3.8–5.2)
WBC # BLD AUTO: 9 10E9/L (ref 4–11)

## 2020-02-19 PROCEDURE — 36415 COLL VENOUS BLD VENIPUNCTURE: CPT | Performed by: DERMATOLOGY

## 2020-02-19 PROCEDURE — 85025 COMPLETE CBC W/AUTO DIFF WBC: CPT | Performed by: DERMATOLOGY

## 2020-02-19 PROCEDURE — 80053 COMPREHEN METABOLIC PANEL: CPT | Performed by: DERMATOLOGY

## 2020-02-20 LAB
ALBUMIN SERPL-MCNC: 4 G/DL (ref 3.4–5)
ALP SERPL-CCNC: 103 U/L (ref 40–150)
ALT SERPL W P-5'-P-CCNC: 41 U/L (ref 0–50)
ANION GAP SERPL CALCULATED.3IONS-SCNC: 8 MMOL/L (ref 3–14)
AST SERPL W P-5'-P-CCNC: 29 U/L (ref 0–45)
BILIRUB SERPL-MCNC: 0.6 MG/DL (ref 0.2–1.3)
BUN SERPL-MCNC: 20 MG/DL (ref 7–30)
CALCIUM SERPL-MCNC: 9.4 MG/DL (ref 8.5–10.1)
CHLORIDE SERPL-SCNC: 106 MMOL/L (ref 94–109)
CO2 SERPL-SCNC: 25 MMOL/L (ref 20–32)
CREAT SERPL-MCNC: 0.74 MG/DL (ref 0.52–1.04)
GFR SERPL CREATININE-BSD FRML MDRD: 86 ML/MIN/{1.73_M2}
GLUCOSE SERPL-MCNC: 87 MG/DL (ref 70–99)
POTASSIUM SERPL-SCNC: 4.3 MMOL/L (ref 3.4–5.3)
PROT SERPL-MCNC: 7.6 G/DL (ref 6.8–8.8)
SODIUM SERPL-SCNC: 139 MMOL/L (ref 133–144)

## 2020-02-24 ENCOUNTER — TELEPHONE (OUTPATIENT)
Dept: DERMATOLOGY | Facility: CLINIC | Age: 65
End: 2020-02-24

## 2020-02-24 DIAGNOSIS — L30.9 DERMATITIS: ICD-10-CM

## 2020-02-24 RX ORDER — METHOTREXATE 2.5 MG/1
15 TABLET ORAL WEEKLY
Qty: 30 TABLET | Refills: 3 | Status: CANCELLED | OUTPATIENT
Start: 2020-02-24

## 2020-02-24 NOTE — TELEPHONE ENCOUNTER
[facility] Call Center    Phone Message    May a detailed message be left on voicemail: yes , Julio C states wanting to get a call back and to press 0 to speak to Pharmacist.     Reason for Call: Medication Question or concern regarding medication   Prescription Clarification  Name of Medication: methotrexate sodium 2.5 MG TABS  Prescribing Provider: Humberto Bergman    Pharmacy: [facility] [place], [place] - [address].   What on the order needs clarification? Per Julio C is wanting to get a call back in regards medication, Julio C states Pt has not had medication for over a week and needing ASAP          Action Taken: Message routed to:  [facility] Derm    Travel Screening: Not Applicable

## 2020-02-26 RX ORDER — METHOTREXATE 2.5 MG/1
15 TABLET ORAL WEEKLY
Qty: 30 TABLET | Refills: 3 | Status: SHIPPED | OUTPATIENT
Start: 2020-02-26 | End: 2021-01-27

## 2020-02-26 RX ORDER — FOLIC ACID 1 MG/1
1 TABLET ORAL DAILY
Qty: 90 TABLET | Refills: 3 | Status: SHIPPED | OUTPATIENT
Start: 2020-02-26 | End: 2021-03-31

## 2020-02-26 NOTE — TELEPHONE ENCOUNTER
[facility] Call Center    Phone Message    May a detailed message be left on voicemail: yes     Reason for Call: Other: Per call from PT is following up on the status of the RX and please reach out to the PT if there's a problem filling the RX ASAP.      Action Taken: Message routed to:  [facility] Dermatology    Travel Screening: Not Applicable

## 2020-02-26 NOTE — TELEPHONE ENCOUNTER
Informed patient that Dr. Bergman refilled the medication this morning and would like her to take a folic acid tablet as well. Confirmed the RX was sent to the correct pharmacy. Verbal understanding and patient will be checking with her pharmacy.    Felisha Linares CMA

## 2020-02-28 ENCOUNTER — TELEPHONE (OUTPATIENT)
Dept: DERMATOLOGY | Facility: CLINIC | Age: 65
End: 2020-02-28

## 2020-02-28 NOTE — TELEPHONE ENCOUNTER
[facility] Call Center    Phone Message    May a detailed message be left on voicemail: yes     Reason for Call: Other: Patient returned call - please try her back on cell#     Action Taken: Message routed to:  [facility] Derm    Travel Screening: Not Applicable

## 2020-02-28 NOTE — TELEPHONE ENCOUNTER
I called the patient and I left a message asking that she call back.   Jackeline Gardner, [facility]

## 2020-02-28 NOTE — TELEPHONE ENCOUNTER
[facility] Call Center    Phone Message    May a detailed message be left on voicemail: yes     Reason for Call: Medication Question or concern regarding medication   Prescription Clarification  Name of Medication: methotrexate sodium 2.5 MG TABS     Prescribing Provider: Humberto Bergman MD   Pharmacy: [address]   What on the order needs clarification?   The patient would like a call from the care team to go over this medication, she wants to get the shingles vaccine but is concerned this medication will conflict with the vaccine please call patient to address concerns.          Action Taken: Message routed to:  [facility] Derm    Travel Screening: Not Applicable

## 2020-03-11 ENCOUNTER — HEALTH MAINTENANCE LETTER (OUTPATIENT)
Age: 65
End: 2020-03-11

## 2020-08-31 LAB — MAMMOGRAM: NORMAL

## 2021-01-03 ENCOUNTER — HEALTH MAINTENANCE LETTER (OUTPATIENT)
Age: 66
End: 2021-01-03

## 2021-01-18 DIAGNOSIS — Z79.899 ENCOUNTER FOR LONG-TERM (CURRENT) USE OF HIGH-RISK MEDICATION: ICD-10-CM

## 2021-01-18 LAB
BASOPHILS # BLD AUTO: 0 10E9/L (ref 0–0.2)
BASOPHILS NFR BLD AUTO: 0.4 %
DIFFERENTIAL METHOD BLD: NORMAL
EOSINOPHIL # BLD AUTO: 0.2 10E9/L (ref 0–0.7)
EOSINOPHIL NFR BLD AUTO: 2.1 %
ERYTHROCYTE [DISTWIDTH] IN BLOOD BY AUTOMATED COUNT: 13.6 % (ref 10–15)
HCT VFR BLD AUTO: 42.3 % (ref 35–47)
HGB BLD-MCNC: 13.8 G/DL (ref 11.7–15.7)
LYMPHOCYTES # BLD AUTO: 1.7 10E9/L (ref 0.8–5.3)
LYMPHOCYTES NFR BLD AUTO: 24.2 %
MCH RBC QN AUTO: 30.6 PG (ref 26.5–33)
MCHC RBC AUTO-ENTMCNC: 32.6 G/DL (ref 31.5–36.5)
MCV RBC AUTO: 94 FL (ref 78–100)
MONOCYTES # BLD AUTO: 0.6 10E9/L (ref 0–1.3)
MONOCYTES NFR BLD AUTO: 9 %
NEUTROPHILS # BLD AUTO: 4.6 10E9/L (ref 1.6–8.3)
NEUTROPHILS NFR BLD AUTO: 64.3 %
PLATELET # BLD AUTO: 279 10E9/L (ref 150–450)
RBC # BLD AUTO: 4.51 10E12/L (ref 3.8–5.2)
WBC # BLD AUTO: 7.2 10E9/L (ref 4–11)

## 2021-01-18 PROCEDURE — 85025 COMPLETE CBC W/AUTO DIFF WBC: CPT | Performed by: DERMATOLOGY

## 2021-01-18 PROCEDURE — 80053 COMPREHEN METABOLIC PANEL: CPT | Performed by: DERMATOLOGY

## 2021-01-18 PROCEDURE — 36415 COLL VENOUS BLD VENIPUNCTURE: CPT | Performed by: DERMATOLOGY

## 2021-01-19 LAB
ALBUMIN SERPL-MCNC: 4 G/DL (ref 3.4–5)
ALP SERPL-CCNC: 101 U/L (ref 40–150)
ALT SERPL W P-5'-P-CCNC: 25 U/L (ref 0–50)
ANION GAP SERPL CALCULATED.3IONS-SCNC: 5 MMOL/L (ref 3–14)
AST SERPL W P-5'-P-CCNC: 26 U/L (ref 0–45)
BILIRUB SERPL-MCNC: 0.6 MG/DL (ref 0.2–1.3)
BUN SERPL-MCNC: 20 MG/DL (ref 7–30)
CALCIUM SERPL-MCNC: 9.3 MG/DL (ref 8.5–10.1)
CHLORIDE SERPL-SCNC: 107 MMOL/L (ref 94–109)
CO2 SERPL-SCNC: 28 MMOL/L (ref 20–32)
CREAT SERPL-MCNC: 0.82 MG/DL (ref 0.52–1.04)
GFR SERPL CREATININE-BSD FRML MDRD: 75 ML/MIN/{1.73_M2}
GLUCOSE SERPL-MCNC: 86 MG/DL (ref 70–99)
POTASSIUM SERPL-SCNC: 4.5 MMOL/L (ref 3.4–5.3)
PROT SERPL-MCNC: 7.8 G/DL (ref 6.8–8.8)
SODIUM SERPL-SCNC: 140 MMOL/L (ref 133–144)

## 2021-01-21 ENCOUNTER — DOCUMENTATION ONLY (OUTPATIENT)
Dept: CARE COORDINATION | Facility: CLINIC | Age: 66
End: 2021-01-21

## 2021-01-27 ENCOUNTER — VIRTUAL VISIT (OUTPATIENT)
Dept: DERMATOLOGY | Facility: CLINIC | Age: 66
End: 2021-01-27
Payer: COMMERCIAL

## 2021-01-27 DIAGNOSIS — M35.00 SJOGREN'S SYNDROME WITHOUT EXTRAGLANDULAR INVOLVEMENT (H): ICD-10-CM

## 2021-01-27 DIAGNOSIS — L30.9 DERMATITIS: Primary | ICD-10-CM

## 2021-01-27 PROCEDURE — 99213 OFFICE O/P EST LOW 20 MIN: CPT | Mod: 95 | Performed by: DERMATOLOGY

## 2021-01-27 RX ORDER — METHOTREXATE 2.5 MG/1
15 TABLET ORAL WEEKLY
Qty: 30 TABLET | Refills: 4 | Status: SHIPPED | OUTPATIENT
Start: 2021-01-27 | End: 2021-09-01

## 2021-01-27 ASSESSMENT — PAIN SCALES - GENERAL: PAINLEVEL: NO PAIN (0)

## 2021-01-27 NOTE — NURSING NOTE
Dermatology Rooming Note    Sahara Whiteside's goals for this visit include:   Chief Complaint   Patient presents with     Derm Problem     Dermatitis - Harry states it seems to be better in the summer than the winter.     Felisha Linares, [facility]

## 2021-01-27 NOTE — LETTER
1/27/2021       RE: Sahara Whiteside  [address] [address]     Dear Colleague,    Thank you for referring your patient, Sahara Whiteside, to the [facility] [place] at [facility]. Please see a copy of my visit note below.    [facility] Dermatology Note  Encounter Date: Jan 27, 2021  Store-and-Forward and Telephone ([phone]). Location of teledermatologist: [facility] [place].  Start time: 9:14. End time: 9:27.    Dermatology Problem List:  1. 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 Sicca symptoms: rheumatology referral  ____________________________________________    Assessment & Plan:   1. Eczematous dermatitis: well controlled on low-dose methotrexate with essentially no active disease. Does get occasional outbreaks from physical sources, but uncommonly. Will continue current regimen. Due for surveillance labs.  - continue methotrexate 15 mg weekly with folate supplementation  - check CBC, CMP    2. Sicca symptoms: referral to rheumatology per patient. Consider diagnostic mucosal lip biopsy after COVID-19 pandemic.    Procedures Performed:    None    Follow-up: 6 months    Staff:     Humberto Bergman MD   of Dermatology  Department of Dermatology  [facility] [employer] [facility]    ____________________________________________    CC: Derm Problem (Dermatitis - Harry states it seems to be better in the summer than the winter.)    HPI:  Ms. Sahara Whiteside is a(n) 65 year old female who presents today as a return patient for eczematous dermatitis    Overall things are going well - always a bit worse in the winter    After prolonged  period in care (~6 hrs) - did have outbreak of hives - numerous all over leg   - applied ice pack - went away after a few minutes but very uncomfortable   - does tend to be more active when less mobile    Red eye a few weeks ago - lasted a few weeks; unsure when first appeared   - saw ophthalmologist - not an infection - concerned for rheumatoid arthritis   - has dry eye symptoms, dry mouth   - recent labs - MARINO negative    Does develop mouth sores - exacerbated by salty foods    Patient is otherwise feeling well, without additional concerns.    Labs:  1/18/2021 - CBC, CMP unremarkable  Care Everywhere - 1/19/2021 - SSA/B, MARINO, ANCA negative    Physical Exam:  Vitals: There were no vitals taken for this visit.  SKIN: Teledermatology photos were reviewed; image quality and interpretability: acceptable. Image date: see upload date.  - no rash appreciated  - No other lesions of concern on areas examined.     Medications:  Current Outpatient Medications   Medication     folic acid (FOLVITE) 1 MG tablet     letrozole (FEMARA) 2.5 MG tablet     methotrexate sodium 2.5 MG TABS     No current facility-administered medications for this visit.       Past Medical/Surgical History:   Patient Active Problem List   Diagnosis     Dermatitis     No past medical history on file.    CC Referred Self, MD  No address on file on close of this encounter.

## 2021-01-27 NOTE — PROGRESS NOTES
[facility] Dermatology Note  Encounter Date: Jan 27, 2021  Store-and-Forward and Telephone ([phone]). Location of teledermatologist: [facility] [place].  Start time: 9:14. End time: 9:27.    Dermatology Problem List:  1. 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 Sicca symptoms: rheumatology referral  ____________________________________________    Assessment & Plan:   1. Eczematous dermatitis: well controlled on low-dose methotrexate with essentially no active disease. Does get occasional outbreaks from physical sources, but uncommonly. Will continue current regimen. Due for surveillance labs.  - continue methotrexate 15 mg weekly with folate supplementation  - check CBC, CMP    2. Sicca symptoms: referral to rheumatology per patient. Consider diagnostic mucosal lip biopsy after COVID-19 pandemic.    Procedures Performed:    None    Follow-up: 6 months    Staff:     Humberto Bergman MD   of Dermatology  Department of Dermatology  [employer]    ____________________________________________    CC: Derm Problem (Dermatitis - Harry states it seems to be better in the summer than the winter.)    HPI:  Ms. Sahara Whiteside is a(n) 65 year old female who presents today as a return patient for eczematous dermatitis    Overall things are going well - always a bit worse in the winter    After prolonged period in care (~6 hrs) - did have outbreak of hives - numerous all over leg   - applied ice pack - went away after a few minutes but very uncomfortable   - does tend to be more active when less mobile    Red eye a few weeks ago - lasted a few weeks; unsure when first appeared   - saw ophthalmologist - not an  infection - concerned for rheumatoid arthritis   - has dry eye symptoms, dry mouth   - recent labs - MARINO negative    Does develop mouth sores - exacerbated by salty foods    Patient is otherwise feeling well, without additional concerns.    Labs:  1/18/2021 - CBC, CMP unremarkable  Care Everywhere - 1/19/2021 - SSA/B, MARINO, ANCA negative    Physical Exam:  Vitals: There were no vitals taken for this visit.  SKIN: Teledermatology photos were reviewed; image quality and interpretability: acceptable. Image date: see upload date.  - no rash appreciated  - No other lesions of concern on areas examined.     Medications:  Current Outpatient Medications   Medication     folic acid (FOLVITE) 1 MG tablet     letrozole (FEMARA) 2.5 MG tablet     methotrexate sodium 2.5 MG TABS     No current facility-administered medications for this visit.       Past Medical/Surgical History:   Patient Active Problem List   Diagnosis     Dermatitis     No past medical history on file.    CC Referred Self, MD  No address on file on close of this encounter.

## 2021-01-27 NOTE — PATIENT INSTRUCTIONS
[facility] Dermatology Visit    Thank you for allowing us to participate in your care. Your findings, instructions and follow-up plan are as follows:     Continue methotrexate 15 mg (6 pills) weekly  Check labs     When should I call my doctor?    If you are worsening or not improving, please, contact us or seek urgent care as noted below.     Who should I call with questions (adults)?    [facility] (adult and pediatric): [phone]     [facility] (adult): [phone]    For urgent needs outside of business hours call the [facility] at [phone] and ask for the dermatology resident on call    If this is a medical emergency and you are unable to reach an ER, Call [phone]      Who should I call with questions (pediatric)?  [facility]- Pediatric Dermatology  Dr. Kallie Hernandez, Dr. Karly Flynn, Dr. Alanna Bradford, Ev Hager, PA  Dr. Jessica Henderson, Dr. Sudha Lopez & Dr. Humberto Pham  Non Urgent  Nurse Triage Line; [phone]- Leonora and Kendra RIVERA Care Coordinators   Jing (/Complex ) [phone]    If you need a prescription refill, please contact your pharmacy. Refills are approved or denied by our Physicians during normal business hours, Monday through Fridays  Per office policy, refills will not be granted if you have not been seen within the past year (or sooner depending on your child's condition)    Scheduling Information:  Pediatric Appointment Scheduling and Call Center [phone]  Radiology Scheduling- [phone]  Sedation Unit Scheduling- [phone]  [place] Scheduling- General [phone]; Pediatric Dermatology [phone]  Main  Services: [phone]  Sao Tomean: [phone]  Emirati: [phone]  Hmong/Jg/Nilson: [phone]  Preadmission Nursing Department Fax Number: [phone] (Fax all pre-operative paperwork to this  number)    For urgent matters arising during evenings, weekends, or holidays that cannot wait for normal business hours please call [phone] and ask for the Dermatology Resident On-Call to be paged.

## 2021-03-31 ENCOUNTER — VIRTUAL VISIT (OUTPATIENT)
Dept: DERMATOLOGY | Facility: CLINIC | Age: 66
End: 2021-03-31
Payer: COMMERCIAL

## 2021-03-31 DIAGNOSIS — L30.9 DERMATITIS: Primary | ICD-10-CM

## 2021-03-31 DIAGNOSIS — Z79.899 ENCOUNTER FOR LONG-TERM (CURRENT) USE OF HIGH-RISK MEDICATION: ICD-10-CM

## 2021-03-31 PROCEDURE — 99213 OFFICE O/P EST LOW 20 MIN: CPT | Mod: GQ | Performed by: DERMATOLOGY

## 2021-03-31 RX ORDER — FOLIC ACID 1 MG/1
1 TABLET ORAL DAILY
Qty: 90 TABLET | Refills: 3 | Status: SHIPPED | OUTPATIENT
Start: 2021-03-31 | End: 2022-09-19

## 2021-03-31 ASSESSMENT — PAIN SCALES - GENERAL: PAINLEVEL: NO PAIN (0)

## 2021-03-31 NOTE — PATIENT INSTRUCTIONS
[facility] Dermatology Visit    Thank you for allowing us to participate in your care. Your findings, instructions and follow-up plan are as follows:     Continue current regimen    When should I call my doctor?    If you are worsening or not improving, please, contact us or seek urgent care as noted below.     Who should I call with questions (adults)?    [facility] (adult and pediatric): [phone]     [facility] (adult): [phone]    For urgent needs outside of business hours call the [facility] at [phone] and ask for the dermatology resident on call    If this is a medical emergency and you are unable to reach an ER, Call [phone]      Who should I call with questions (pediatric)?  [facility]- Pediatric Dermatology  Dr. Kallie Hernandez, Dr. Karly Flynn, Dr. Alanna Bradford, Ev Hager, PA  Dr. Jessica Henderson, Dr. Sudha Lopez & Dr. Humberto Pham  Non Urgent  Nurse Triage Line; [phone]- Leonora and Kendra RIVERA Care Coordinators   Jing (/Complex ) [phone]    If you need a prescription refill, please contact your pharmacy. Refills are approved or denied by our Physicians during normal business hours, Monday through Fridays  Per office policy, refills will not be granted if you have not been seen within the past year (or sooner depending on your child's condition)    Scheduling Information:  Pediatric Appointment Scheduling and Call Center [phone]  Radiology Scheduling- [phone]  Sedation Unit Scheduling- [phone]  [place] Scheduling- General [phone]; Pediatric Dermatology [phone]  Main  Services: [phone]  Wolof: [phone]  Ghanaian: [phone]  Hmong/Martiniquais/Nilson: [phone]  Preadmission Nursing Department Fax Number: [phone] (Fax all pre-operative paperwork to this number)    For urgent matters  arising during evenings, weekends, or holidays that cannot wait for normal business hours please call [phone] and ask for the Dermatology Resident On-Call to be paged.

## 2021-03-31 NOTE — LETTER
3/31/2021       RE: Sahara Whiteside  [address] [address]     Dear Colleague,    Thank you for referring your patient, Sahara Whiteside, to the [facility] DERMATOLOGY CLINIC [place] at [facility]. Please see a copy of my visit note below.    [facility] Dermatology Note  Encounter Date: Mar 31, 2021  MyChart Connected.    Dermatology Problem List:  1. 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 Sicca symptoms: rheumatology referral    ____________________________________________    Assessment & Plan:     1. Eczematous/urticarial dermatitis: well-controlled even with reduction in methotrexate frequency to q2 weeks. Will continue to gradually taper methotrexate to lowest tolerated dose. Discussed reducing frequency OR dose every few months.   - continue methotrexate 15 mg q2 weeks; gradually taper dose    2. Sicca symptoms: stable; rheumatology appointment pending    Procedures Performed:    None    Follow-up: 6 months    Staff:     Humberto Bergman MD   of Dermatology  Department of Dermatology  [facility]    ____________________________________________    CC: Derm Problem (Dermatitis - Sahara states she has been stable. She was taking 6 tablets of methotrexate sodium 2.5 MG TABS every two weeks.)    HPI:  Ms. Sahara Whiteside is a(n) 65 year old female who presents today as a return patient for dermatitis    Dermatitis - very well controlled - does have some physical triggers - last outbreak was with long ride with [place]  - applied ice and went away in 15-20 minutes  - reduced frequency of methotrexate 15 mg to every 2 weeks  - needs refill from folic  acid    Patient is otherwise feeling well, without additional skin concerns.    Labs Reviewed:  1/2021 CBC, CMP unremarkable    Physical Exam:  Vitals: There were no vitals taken for this visit.  SKIN: Video images were reviewed; image quality and interpretability: acceptable. Image date: n/a.  - no apparent rash  - No other lesions of concern on areas examined.     Medications:  Current Outpatient Medications   Medication     folic acid (FOLVITE) 1 MG tablet     letrozole (FEMARA) 2.5 MG tablet     methotrexate sodium 2.5 MG TABS     No current facility-administered medications for this visit.       Past Medical/Surgical History:   Patient Active Problem List   Diagnosis     Dermatitis     No past medical history on file.    CC Referred Self, MD  No address on file on close of this encounter.

## 2021-03-31 NOTE — PROGRESS NOTES
[facility] Dermatology Note  Encounter Date: Mar 31, 2021  MyChart Connected.    Dermatology Problem List:  1. 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 Sicca symptoms: rheumatology referral    ____________________________________________    Assessment & Plan:     1. Eczematous/urticarial dermatitis: well-controlled even with reduction in methotrexate frequency to q2 weeks. Will continue to gradually taper methotrexate to lowest tolerated dose. Discussed reducing frequency OR dose every few months.   - continue methotrexate 15 mg q2 weeks; gradually taper dose    2. Sicca symptoms: stable; rheumatology appointment pending    Procedures Performed:    None    Follow-up: 6 months    Staff:     Humberto Bergman MD   of Dermatology  Department of Dermatology  [facility]    ____________________________________________    CC: Derm Problem (Dermatitis - Sahara states she has been stable. She was taking 6 tablets of methotrexate sodium 2.5 MG TABS every two weeks.)    HPI:  Ms. Sahara Whiteside is a(n) 65 year old female who presents today as a return patient for dermatitis    Dermatitis - very well controlled - does have some physical triggers - last outbreak was with long ride with [email or web address]  - applied ice and went away in 15-20 minutes  - reduced frequency of methotrexate 15 mg to every 2 weeks  - needs refill from folic acid    Patient is otherwise feeling well, without additional skin concerns.    Labs Reviewed:  1/2021 CBC, CMP unremarkable    Physical Exam:  Vitals: There were no vitals taken for this visit.  SKIN: Video images were reviewed; image quality and interpretability: acceptable. Image date: n/a.  - no apparent rash  - No  other lesions of concern on areas examined.     Medications:  Current Outpatient Medications   Medication     folic acid (FOLVITE) 1 MG tablet     letrozole (FEMARA) 2.5 MG tablet     methotrexate sodium 2.5 MG TABS     No current facility-administered medications for this visit.       Past Medical/Surgical History:   Patient Active Problem List   Diagnosis     Dermatitis     No past medical history on file.    CC Referred Self, MD  No address on file on close of this encounter.

## 2021-03-31 NOTE — NURSING NOTE
Dermatology Rooming Note    Sahara Whiteside's goals for this visit include:   Chief Complaint   Patient presents with     Derm Problem     Dermatitis - Sahara states she has been stable. She was taking 6 tablets of methotrexate sodium 2.5 MG TABS every two weeks.     Felisha Linares CMA

## 2021-09-01 ENCOUNTER — VIRTUAL VISIT (OUTPATIENT)
Dept: DERMATOLOGY | Facility: CLINIC | Age: 66
End: 2021-09-01
Payer: COMMERCIAL

## 2021-09-01 DIAGNOSIS — L30.9 DERMATITIS: Primary | ICD-10-CM

## 2021-09-01 DIAGNOSIS — Z79.899 ENCOUNTER FOR LONG-TERM (CURRENT) USE OF HIGH-RISK MEDICATION: ICD-10-CM

## 2021-09-01 PROCEDURE — 99213 OFFICE O/P EST LOW 20 MIN: CPT | Mod: 95 | Performed by: DERMATOLOGY

## 2021-09-01 RX ORDER — METHOTREXATE 2.5 MG/1
15 TABLET ORAL WEEKLY
Qty: 30 TABLET | Refills: 4 | Status: SHIPPED | OUTPATIENT
Start: 2021-09-01 | End: 2022-09-19

## 2021-09-01 NOTE — NURSING NOTE
Dermatology Rooming Note    Sahara Whiteside's goals for this visit include:   Chief Complaint   Patient presents with     Derm Problem     Sahara is having a virtual visit today for dermatitis. My skin has been clear since starting the medication.     DAVID Page

## 2021-09-01 NOTE — LETTER
9/1/2021       RE: Sahara Whiteside  [address] [address]     Dear Colleague,    Thank you for referring your patient, Sahara Whiteside, to the [facility]ERMATOLOG[facility] [place] at [facility]. Please see a copy of my visit note below.    [facility] Dermatology Note  Encounter Date: Sep 1, 2021  Store-and-Forward and Telephone ([phone]). Location of teledermatologist: [facility] [place].  Start time: 8:35. End time: 8:41.    Dermatology Problem List:  1. 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 Sicca symptoms: rheumatology referral    ____________________________________________    Assessment & Plan:     1. Eczematous dermatitis: overall doing very well on methotrexate q2-4 weeks. Tends to have sun exposure in summer so can dose q4 weeks, but usually needs increased frequency over the winter months. Likely deriving benefit from UV exposure. Will continue current course as has had flares with lowering dose of methotrexate in the past. Due for labs.  - check CBC, CMP  - continue methotrexate 15 mg q2-4 weeks    2. History of sun exposure/immunosuppression: due for FBSE. Discussed sun protective behaviors including OTC spf 30+ sunscreen use and sun avoidance strategies.  - come in for FBSE in next 3 months    Procedures Performed:    None    Follow-up: 3 months in person for FBSE, 6-12 months virtual for dermatitis    Staff:     Humberto Bergman MD, FAAD   of Dermatology  Department of Dermatology  [facility]    ____________________________________________    CC: Derm Problem (Sahara is having a virtual visit today for  dermatitis. My skin has been clear since starting the medication.)    HPI:  Ms. Sahara Whiteside is a(n) 65 year old female who presents today as a return patient for dermatitis    Rashes - in summer time, can go q4 weeks on methotrexate - do need to use more often in wintertime  - some benefit from sun exposure  - would like to have skin check    Patient is otherwise feeling well, without additional skin concerns.    Labs Reviewed:  1/2021 CBC, CMP unremarkable    Physical Exam:  Vitals: There were no vitals taken for this visit.  SKIN: Teledermatology photos were reviewed; image quality and interpretability: acceptable. Image date: 9/1/21.  - no active rash  - No other lesions of concern on areas examined.     Medications:  Current Outpatient Medications   Medication     folic acid (FOLVITE) 1 MG tablet     letrozole (FEMARA) 2.5 MG tablet     methotrexate sodium 2.5 MG TABS     No current facility-administered medications for this visit.      Past Medical/Surgical History:   Patient Active Problem List   Diagnosis     Dermatitis     No past medical history on file.  CC Referred Self, MD  No address on file on close of this encounter.

## 2021-09-01 NOTE — PROGRESS NOTES
[facility] Dermatology Note  Encounter Date: Sep 1, 2021  Store-and-Forward and Telephone ([phone]). Location of teledermatologist: [facility] [place].  Start time: 8:35. End time: 8:41.    Dermatology Problem List:  1. 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 Sicca symptoms: rheumatology referral    ____________________________________________    Assessment & Plan:     1. Eczematous dermatitis: overall doing very well on methotrexate q2-4 weeks. Tends to have sun exposure in summer so can dose q4 weeks, but usually needs increased frequency over the winter months. Likely deriving benefit from UV exposure. Will continue current course as has had flares with lowering dose of methotrexate in the past. Due for labs.  - check CBC, CMP  - continue methotrexate 15 mg q2-4 weeks    2. History of sun exposure/immunosuppression: due for FBSE. Discussed sun protective behaviors including OTC spf 30+ sunscreen use and sun avoidance strategies.  - come in for FBSE in next 3 months    Procedures Performed:    None    Follow-up: 3 months in person for FBSE, 6-12 months virtual for dermatitis    Staff:     Humberto Bergman MD, FAAD   of Dermatology  Department of Dermatology  [facility]    ____________________________________________    CC: Derm Problem (Sahara is having a virtual visit today for dermatitis. My skin has been clear since starting the medication.)    HPI:  Ms. Sahara Whiteside is a(n) 65 year old female who presents today as a return patient for dermatitis    Rashes - in summer time, can go q4 weeks on methotrexate - do need to use more often in wintertime  - some benefit from sun exposure  -  would like to have skin check    Patient is otherwise feeling well, without additional skin concerns.    Labs Reviewed:  1/2021 CBC, CMP unremarkable    Physical Exam:  Vitals: There were no vitals taken for this visit.  SKIN: Teledermatology photos were reviewed; image quality and interpretability: acceptable. Image date: 9/1/21.  - no active rash  - No other lesions of concern on areas examined.     Medications:  Current Outpatient Medications   Medication     folic acid (FOLVITE) 1 MG tablet     letrozole (FEMARA) 2.5 MG tablet     methotrexate sodium 2.5 MG TABS     No current facility-administered medications for this visit.      Past Medical/Surgical History:   Patient Active Problem List   Diagnosis     Dermatitis     No past medical history on file.    CC Referred Self, MD  No address on file on close of this encounter.

## 2021-09-16 ENCOUNTER — LAB (OUTPATIENT)
Dept: LAB | Facility: CLINIC | Age: 66
End: 2021-09-16
Payer: COMMERCIAL

## 2021-09-16 DIAGNOSIS — Z79.899 ENCOUNTER FOR LONG-TERM (CURRENT) USE OF HIGH-RISK MEDICATION: ICD-10-CM

## 2021-09-16 LAB
BASOPHILS # BLD AUTO: 0 10E3/UL (ref 0–0.2)
BASOPHILS NFR BLD AUTO: 1 %
EOSINOPHIL # BLD AUTO: 0.4 10E3/UL (ref 0–0.7)
EOSINOPHIL NFR BLD AUTO: 6 %
ERYTHROCYTE [DISTWIDTH] IN BLOOD BY AUTOMATED COUNT: 13.1 % (ref 10–15)
HCT VFR BLD AUTO: 44.8 % (ref 35–47)
HGB BLD-MCNC: 14.9 G/DL (ref 11.7–15.7)
LYMPHOCYTES # BLD AUTO: 1.8 10E3/UL (ref 0.8–5.3)
LYMPHOCYTES NFR BLD AUTO: 27 %
MCH RBC QN AUTO: 30.5 PG (ref 26.5–33)
MCHC RBC AUTO-ENTMCNC: 33.3 G/DL (ref 31.5–36.5)
MCV RBC AUTO: 92 FL (ref 78–100)
MONOCYTES # BLD AUTO: 0.6 10E3/UL (ref 0–1.3)
MONOCYTES NFR BLD AUTO: 10 %
NEUTROPHILS # BLD AUTO: 3.8 10E3/UL (ref 1.6–8.3)
NEUTROPHILS NFR BLD AUTO: 57 %
PLATELET # BLD AUTO: 279 10E3/UL (ref 150–450)
RBC # BLD AUTO: 4.88 10E6/UL (ref 3.8–5.2)
WBC # BLD AUTO: 6.6 10E3/UL (ref 4–11)

## 2021-09-16 PROCEDURE — 36415 COLL VENOUS BLD VENIPUNCTURE: CPT

## 2021-09-16 PROCEDURE — 85025 COMPLETE CBC W/AUTO DIFF WBC: CPT

## 2021-09-16 PROCEDURE — 80053 COMPREHEN METABOLIC PANEL: CPT

## 2021-09-18 LAB
ALBUMIN SERPL-MCNC: 4 G/DL (ref 3.4–5)
ALP SERPL-CCNC: 106 U/L (ref 40–150)
ALT SERPL W P-5'-P-CCNC: 37 U/L (ref 0–50)
ANION GAP SERPL CALCULATED.3IONS-SCNC: 5 MMOL/L (ref 3–14)
AST SERPL W P-5'-P-CCNC: 30 U/L (ref 0–45)
BILIRUB SERPL-MCNC: 1.2 MG/DL (ref 0.2–1.3)
BUN SERPL-MCNC: 16 MG/DL (ref 7–30)
CALCIUM SERPL-MCNC: 9.4 MG/DL (ref 8.5–10.1)
CHLORIDE BLD-SCNC: 107 MMOL/L (ref 94–109)
CO2 SERPL-SCNC: 27 MMOL/L (ref 20–32)
CREAT SERPL-MCNC: 0.75 MG/DL (ref 0.52–1.04)
GFR SERPL CREATININE-BSD FRML MDRD: 84 ML/MIN/1.73M2
GLUCOSE BLD-MCNC: 85 MG/DL (ref 70–99)
POTASSIUM BLD-SCNC: 4.8 MMOL/L (ref 3.4–5.3)
PROT SERPL-MCNC: 7.8 G/DL (ref 6.8–8.8)
SODIUM SERPL-SCNC: 139 MMOL/L (ref 133–144)

## 2021-10-10 ENCOUNTER — HEALTH MAINTENANCE LETTER (OUTPATIENT)
Age: 66
End: 2021-10-10

## 2021-12-06 ENCOUNTER — OFFICE VISIT (OUTPATIENT)
Dept: DERMATOLOGY | Facility: CLINIC | Age: 66
End: 2021-12-06
Payer: COMMERCIAL

## 2021-12-06 DIAGNOSIS — D22.9 MULTIPLE BENIGN NEVI: ICD-10-CM

## 2021-12-06 DIAGNOSIS — L30.8 OTHER ECZEMA: ICD-10-CM

## 2021-12-06 DIAGNOSIS — L73.9 FOLLICULITIS: ICD-10-CM

## 2021-12-06 DIAGNOSIS — D18.01 CHERRY ANGIOMA: Primary | ICD-10-CM

## 2021-12-06 DIAGNOSIS — L82.1 SEBORRHEIC KERATOSIS: ICD-10-CM

## 2021-12-06 PROCEDURE — 99214 OFFICE O/P EST MOD 30 MIN: CPT | Performed by: PHYSICIAN ASSISTANT

## 2021-12-06 ASSESSMENT — PAIN SCALES - GENERAL: PAINLEVEL: NO PAIN (0)

## 2021-12-06 NOTE — LETTER
12/6/2021      RE: Sahara Whiteside  [address] [address]       [facility] Dermatology Note  Encounter Date: Dec 6, 2021  Office Visit     Dermatology Problem List:  Last FBSE 12/6/21  1. 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 Sicca symptoms: rheumatology referral  3. Folliculitis  ____________________________________________    Assessment & Plan:    # Eczematous dermatitis  Well controlled. Follows with Dr. Bergman. Continue current regiment.   - Continue methotrexate 15 mg q2-4 weeks  - Patient has standing lab orders to be done prior to next visit with Dr. Bergman in March 2022.      #  History of sun exposure/immunosuppression. Discussed sun protective behaviors including OTC spf 30+ sunscreen use and sun avoidance strategies.    # Folliculitis  - Recommended using an OTC antibacterial wash like BPO.     # Multiple benign nevi.   - No concerning lesions today  - Counseled on ABCDEs of melanoma and sun protection - recommend SPF 30 or higher with frequent application   - Return sooner if noticing changing or symptomatic lesions    # Seborrheic Keratoses. Cherry angioma.  Discussed the natural history and benign nature of this lesion. Reassurance provided that no additional treatment is necessary.     #. 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 Discussed sun protection.     Procedures Performed:   None    Follow-up: As scheduled with Dr. Bergman in March 2022 for virtual visit.     Staff and Scribe:     Scribe Disclosure:  ISeema, am serving as a scribe to document services personally performed by Ev Hager PA-C based on data collection and the provider's statements to me.     Provider Disclosure:   The documentation recorded  by the scribe accurately reflects the services I personally performed and the decisions made by me.    All risks, benefits and alternatives were discussed with patient.  Patient is in agreement and understands the assessment and plan.  All questions were answered.  Sun Screen Education was given.   Return to Clinic for a skin check annually or sooner as needed.   Ev Hager PA-C   [facility]   ____________________________________________    CC: Skin Check (pt states she is here for full body skin check. spot on )    HPI:  Ms. Sahara Whiteside is a(n) 66 year old female who presents today as a return patient for FBSE. The patient was last seen in dermatology virtually on 9/1/21 by Dr. Bergman at which point she was continued on methotrexate 15 mg q2-4 weeks for treatment of eczematous dermatitis.     Today, the patient reports having a dark spot on her forehead that is asymptomatic. When prompted, she reports having some discoloration of her lower legs and neck that has been present for numerous years. No personal or family history of skin cancer. She wears sunscreen if going to be outside of a long period of time.     Patient is otherwise feeling well, without additional skin concerns.    Labs Reviewed:  N/A    Physical Exam:  Vitals: There were no vitals taken for this visit.  SKIN: Full skin, which includes the head/face, both arms, chest, back, abdomen,both legs, genitalia and/or groin buttocks, digits and/or nails, was examined.  - No pink scaly plaques on the trunk or extremities.  - Reticulated tan patches on her lateral neck and lower anterior neck.   - Few pustules and pink macules noted on the lower buttocks and upper medial thighs  - There are dome shaped bright red papules on the trunk and extremities.   - Multiple regular brown pigmented macules and papules are identified on the trunk and extremities.   - There are waxy stuck on tan to brown papules on the trunk and  extremities. Some are thin brown patches on the forehead.   -Cayanne appering macules on the dorsal feet and ankles.    - No other lesions of concern on areas examined.     Medications:  Current Outpatient Medications   Medication     folic acid (FOLVITE) 1 MG tablet     letrozole (FEMARA) 2.5 MG tablet     methotrexate sodium 2.5 MG TABS     No current facility-administered medications for this visit.      Past Medical History:   Patient Active Problem List   Diagnosis     Dermatitis     No past medical history on file.     CC Aleida Arellano MD  [address] [address] on close of this encounter.        Ev Hager PA-C

## 2021-12-06 NOTE — PROGRESS NOTES
[facility] Dermatology Note  Encounter Date: Dec 6, 2021  Office Visit     Dermatology Problem List:  Last FBSE 12/6/21  1. 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 Sicca symptoms: rheumatology referral  3. Folliculitis  ____________________________________________    Assessment & Plan:    # Eczematous dermatitis  Well controlled. Follows with Dr. Bergman. Continue current regiment.   - Continue methotrexate 15 mg q2-4 weeks  - Patient has standing lab orders to be done prior to next visit with Dr. Bergman in March 2022.      #  History of sun exposure/immunosuppression. Discussed sun protective behaviors including OTC spf 30+ sunscreen use and sun avoidance strategies.    # Folliculitis  - Recommended using an OTC antibacterial wash like BPO.     # Multiple benign nevi.   - No concerning lesions today  - Counseled on ABCDEs of melanoma and sun protection - recommend SPF 30 or higher with frequent application   - Return sooner if noticing changing or symptomatic lesions    # Seborrheic Keratoses. Cherry angioma.  Discussed the natural history and benign nature of this lesion. Reassurance provided that no additional treatment is necessary.     #. 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 Discussed sun protection.     Procedures Performed:   None    Follow-up: As scheduled with Dr. Bergman in March 2022 for virtual visit.     Staff and Scribe:     Scribe Disclosure:  I, Seema Hernandez, am serving as a scribe to document services personally performed by Ev Hager PA-C based on data collection and the provider's statements to me.     Provider Disclosure:   The documentation recorded by the scribe accurately reflects the services I personally performed and the  decisions made by me.    All risks, benefits and alternatives were discussed with patient.  Patient is in agreement and understands the assessment and plan.  All questions were answered.  Sun Screen Education was given.   Return to Clinic for a skin check annually or sooner as needed.   Ev Hager PA-C   [facility]   ____________________________________________    CC: Skin Check (pt states she is here for full body skin check. spot on )    HPI:  Ms. Sahara Whiteside is a(n) 66 year old female who presents today as a return patient for FBSE. The patient was last seen in dermatology virtually on 9/1/21 by Dr. Bergman at which point she was continued on methotrexate 15 mg q2-4 weeks for treatment of eczematous dermatitis.     Today, the patient reports having a dark spot on her forehead that is asymptomatic. When prompted, she reports having some discoloration of her lower legs and neck that has been present for numerous years. No personal or family history of skin cancer. She wears sunscreen if going to be outside of a long period of time.     Patient is otherwise feeling well, without additional skin concerns.    Labs Reviewed:  N/A    Physical Exam:  Vitals: There were no vitals taken for this visit.  SKIN: Full skin, which includes the head/face, both arms, chest, back, abdomen,both legs, genitalia and/or groin buttocks, digits and/or nails, was examined.  - No pink scaly plaques on the trunk or extremities.  - Reticulated tan patches on her lateral neck and lower anterior neck.   - Few pustules and pink macules noted on the lower buttocks and upper medial thighs  - There are dome shaped bright red papules on the trunk and extremities.   - Multiple regular brown pigmented macules and papules are identified on the trunk and extremities.   - There are waxy stuck on tan to brown papules on the trunk and extremities. Some are thin brown patches on the forehead.   -Dayday guevara  macules on the dorsal feet and ankles.    - No other lesions of concern on areas examined.     Medications:  Current Outpatient Medications   Medication     folic acid (FOLVITE) 1 MG tablet     letrozole (FEMARA) 2.5 MG tablet     methotrexate sodium 2.5 MG TABS     No current facility-administered medications for this visit.      Past Medical History:   Patient Active Problem List   Diagnosis     Dermatitis     No past medical history on file.     CC Aleida Arellano MD  [facility]  [address] [place] [address] on close of this encounter.

## 2021-12-06 NOTE — LETTER
12/6/2021       RE: Sahara Whiteside  [address] [address]     Dear Colleague,    Thank you for referring your patient, Sahara Whiteside, to the [facility] DERMATOLOGY CLIN[facility] [place] at [facility]. Please see a copy of my visit note below.    [facility] Dermatology Note  Encounter Date: Dec 6, 2021  Office Visit     Dermatology Problem List:  Last FBSE 12/6/21  1. 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 Sicca symptoms: rheumatology referral  3. Folliculitis  ____________________________________________    Assessment & Plan:    # Eczematous dermatitis  Well controlled. Follows with Dr. Bergman. Continue current regiment.   - Continue methotrexate 15 mg q2-4 weeks  - Patient has standing lab orders to be done prior to next visit with Dr. Bergman in March 2022.      #  History of sun exposure/immunosuppression. Discussed sun protective behaviors including OTC spf 30+ sunscreen use and sun avoidance strategies.    # Folliculitis  - Recommended using an OTC antibacterial wash like BPO.     # Multiple benign nevi.   - No concerning lesions today  - Counseled on ABCDEs of melanoma and sun protection - recommend SPF 30 or higher with frequent application   - Return sooner if noticing changing or symptomatic lesions    # Seborrheic Keratoses. Cherry angioma.  Discussed the natural history and benign nature of this lesion. Reassurance provided that no additional treatment is necessary.     #. 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 Discussed sun protection.     Procedures Performed:   None    Follow-up: As scheduled with Dr. Bergman in March 2022 for virtual visit.     Staff and Scribe:     Scribe  Disclosure:  I, Seema Hernandez, am serving as a scribe to document services personally performed by Ev Hager PA-C based on data collection and the provider's statements to me.     Provider Disclosure:   The documentation recorded by the scribe accurately reflects the services I personally performed and the decisions made by me.    All risks, benefits and alternatives were discussed with patient.  Patient is in agreement and understands the assessment and plan.  All questions were answered.  Sun Screen Education was given.   Return to Clinic for a skin check annually or sooner as needed.   Ev Hager PA-C   [facility]   ____________________________________________    CC: Skin Check (pt states she is here for full body skin check. spot on )    HPI:  Ms. Sahara Whiteside is a(n) 66 year old female who presents today as a return patient for FBSE. The patient was last seen in dermatology virtually on 9/1/21 by Dr. Bergman at which point she was continued on methotrexate 15 mg q2-4 weeks for treatment of eczematous dermatitis.     Today, the patient reports having a dark spot on her forehead that is asymptomatic. When prompted, she reports having some discoloration of her lower legs and neck that has been present for numerous years. No personal or family history of skin cancer. She wears sunscreen if going to be outside of a long period of time.     Patient is otherwise feeling well, without additional skin concerns.    Labs Reviewed:  N/A    Physical Exam:  Vitals: There were no vitals taken for this visit.  SKIN: Full skin, which includes the head/face, both arms, chest, back, abdomen,both legs, genitalia and/or groin buttocks, digits and/or nails, was examined.  - No pink scaly plaques on the trunk or extremities.  - Reticulated tan patches on her lateral neck and lower anterior neck.   - Few pustules and pink macules noted on the lower buttocks and upper medial  thighs  - There are dome shaped bright red papules on the trunk and extremities.   - Multiple regular brown pigmented macules and papules are identified on the trunk and extremities.   - There are waxy stuck on tan to brown papules on the trunk and extremities. Some are thin brown patches on the forehead.   -Cayanne appering macules on the dorsal feet and ankles.    - No other lesions of concern on areas examined.     Medications:  Current Outpatient Medications   Medication     folic acid (FOLVITE) 1 MG tablet     letrozole (FEMARA) 2.5 MG tablet     methotrexate sodium 2.5 MG TABS     No current facility-administered medications for this visit.      Past Medical History:   Patient Active Problem List   Diagnosis     Dermatitis     No past medical history on file.     CC Aleida Arellano MD  [address] [address] on close of this encounter.        Again, thank you for allowing me to participate in the care of your patient.      Sincerely,    Ev Hager PA-C

## 2022-01-29 ENCOUNTER — HEALTH MAINTENANCE LETTER (OUTPATIENT)
Age: 67
End: 2022-01-29

## 2022-02-28 ENCOUNTER — LAB (OUTPATIENT)
Dept: LAB | Facility: CLINIC | Age: 67
End: 2022-02-28
Payer: COMMERCIAL

## 2022-02-28 DIAGNOSIS — Z79.899 ENCOUNTER FOR LONG-TERM (CURRENT) USE OF HIGH-RISK MEDICATION: ICD-10-CM

## 2022-02-28 LAB
BASOPHILS # BLD AUTO: 0 10E3/UL (ref 0–0.2)
BASOPHILS NFR BLD AUTO: 0 %
EOSINOPHIL # BLD AUTO: 0.3 10E3/UL (ref 0–0.7)
EOSINOPHIL NFR BLD AUTO: 3 %
ERYTHROCYTE [DISTWIDTH] IN BLOOD BY AUTOMATED COUNT: 13.7 % (ref 10–15)
HCT VFR BLD AUTO: 40.3 % (ref 35–47)
HGB BLD-MCNC: 13.4 G/DL (ref 11.7–15.7)
LYMPHOCYTES # BLD AUTO: 1.5 10E3/UL (ref 0.8–5.3)
LYMPHOCYTES NFR BLD AUTO: 19 %
MCH RBC QN AUTO: 31.2 PG (ref 26.5–33)
MCHC RBC AUTO-ENTMCNC: 33.3 G/DL (ref 31.5–36.5)
MCV RBC AUTO: 94 FL (ref 78–100)
MONOCYTES # BLD AUTO: 0.7 10E3/UL (ref 0–1.3)
MONOCYTES NFR BLD AUTO: 9 %
NEUTROPHILS # BLD AUTO: 5.7 10E3/UL (ref 1.6–8.3)
NEUTROPHILS NFR BLD AUTO: 69 %
PLATELET # BLD AUTO: 244 10E3/UL (ref 150–450)
RBC # BLD AUTO: 4.3 10E6/UL (ref 3.8–5.2)
WBC # BLD AUTO: 8.2 10E3/UL (ref 4–11)

## 2022-02-28 PROCEDURE — 80053 COMPREHEN METABOLIC PANEL: CPT

## 2022-02-28 PROCEDURE — 85025 COMPLETE CBC W/AUTO DIFF WBC: CPT

## 2022-02-28 PROCEDURE — 36415 COLL VENOUS BLD VENIPUNCTURE: CPT

## 2022-03-01 LAB
ALBUMIN SERPL-MCNC: 3.5 G/DL (ref 3.4–5)
ALP SERPL-CCNC: 97 U/L (ref 40–150)
ALT SERPL W P-5'-P-CCNC: 31 U/L (ref 0–50)
ANION GAP SERPL CALCULATED.3IONS-SCNC: 6 MMOL/L (ref 3–14)
AST SERPL W P-5'-P-CCNC: 28 U/L (ref 0–45)
BILIRUB SERPL-MCNC: 0.8 MG/DL (ref 0.2–1.3)
BUN SERPL-MCNC: 19 MG/DL (ref 7–30)
CALCIUM SERPL-MCNC: 8.9 MG/DL (ref 8.5–10.1)
CHLORIDE BLD-SCNC: 109 MMOL/L (ref 94–109)
CO2 SERPL-SCNC: 25 MMOL/L (ref 20–32)
CREAT SERPL-MCNC: 0.78 MG/DL (ref 0.52–1.04)
GFR SERPL CREATININE-BSD FRML MDRD: 83 ML/MIN/1.73M2
GLUCOSE BLD-MCNC: 96 MG/DL (ref 70–99)
POTASSIUM BLD-SCNC: 4.4 MMOL/L (ref 3.4–5.3)
PROT SERPL-MCNC: 7 G/DL (ref 6.8–8.8)
SODIUM SERPL-SCNC: 140 MMOL/L (ref 133–144)

## 2022-03-07 ENCOUNTER — VIRTUAL VISIT (OUTPATIENT)
Dept: DERMATOLOGY | Facility: CLINIC | Age: 67
End: 2022-03-07
Payer: COMMERCIAL

## 2022-03-07 DIAGNOSIS — L30.9 DERMATITIS: Primary | ICD-10-CM

## 2022-03-07 DIAGNOSIS — D84.9 IMMUNOSUPPRESSION (H): ICD-10-CM

## 2022-03-07 PROCEDURE — 99213 OFFICE O/P EST LOW 20 MIN: CPT | Mod: 95 | Performed by: DERMATOLOGY

## 2022-03-07 ASSESSMENT — PAIN SCALES - GENERAL: PAINLEVEL: NO PAIN (0)

## 2022-03-07 NOTE — NURSING NOTE
Chief Complaint   Patient presents with     Derm Problem     Sahara, is here for a follow up no other concerns     Pravin Harvey EMT

## 2022-03-07 NOTE — PROGRESS NOTES
[facility] Dermatology Note  Encounter Date: Mar 7, 2022  Store-and-Forward and Telephone ([phone] ). Location of teledermatologist: [facility] DERMATOLOGY CLINIC [place].  Start time: 8:53. End time: 9:00.    Dermatology Problem List:  Last FBSE 12/6/21  1. 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 Sicca symptoms: rheumatology referral  3. Folliculitis  ____________________________________________    Assessment & Plan:     1. Dermatitis: overall well-controlled on methotrexate ~q2 weeks. Labs from last week look good. Will continue current dosing schedule - anticipate slightly higher dosing needs over the summer. Has been staying very cautious re: COVID-19 due to immunocompromised daughter.  - methotrexate 15 mg every 1-2 weeks  - folate supplementation  - CBC, CMP q6 months    Procedures Performed:    None    Follow-up: 6 months    Staff:     Humberto Bergman MD, FAAD   of Dermatology  Department of Dermatology  [facility]    ____________________________________________    CC: Derm Problem (Sahara, is here for a follow up no other concerns )    HPI:  Ms. Sahara Whiteside is a(n) 66 year old female who presents today as a return patient for dermatitis    Rash - taking methotrexate as needed  - in winter - goes ~2 weeks between doses  - start itching around bra strap which prompts subsequent doses  - recent labs okay  - had COVID-19 in January - fever x2 days then coughing, fatigue lasted about 3 weeks    Patient is otherwise feeling well, without additional skin concerns.    Labs Reviewed:  2/28/22 CBC, CMP unremarkable    Physical Exam:  Vitals: There were no vitals taken for this visit.  SKIN: no photos  - No  other lesions of concern on areas examined.     Medications:  Current Outpatient Medications   Medication     folic acid (FOLVITE) 1 MG tablet     letrozole (FEMARA) 2.5 MG tablet     methotrexate sodium 2.5 MG TABS     No current facility-administered medications for this visit.      Past Medical/Surgical History:   Patient Active Problem List   Diagnosis     Dermatitis     History reviewed. No pertinent past medical history.    CC Aleida Arellano MD  [facility]  [address] [place] [address] on close of this encounter.

## 2022-03-07 NOTE — LETTER
3/7/2022       RE: Sahara Whiteside  [address] [address]     Dear Colleague,    Thank you for referring your patient, Sahara Whiteside, to the [facility] DERMATOLOGY CLINIC [place] at [facility]. Please see a copy of my visit note below.    [facility] Dermatology Note  Encounter Date: Mar 7, 2022  Store-and-Forward and Telephone ([phone] ). Location of teledermatologist: [facility] DERMATOLOGY CLINIC [place].  Start time: 8:53. End time: 9:00.    Dermatology Problem List:  Last FBSE 12/6/21  1. 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 Sicca symptoms: rheumatology referral  3. Folliculitis  ____________________________________________    Assessment & Plan:     1. Dermatitis: overall well-controlled on methotrexate ~q2 weeks. Labs from last week look good. Will continue current dosing schedule - anticipate slightly higher dosing needs over the summer. Has been staying very cautious re: COVID-19 due to immunocompromised daughter.  - methotrexate 15 mg every 1-2 weeks  - folate supplementation  - CBC, CMP q6 months    Procedures Performed:    None    Follow-up: 6 months    Staff:     Humberto Bergman MD, FAAD   of Dermatology  Department of Dermatology  [facility]    ____________________________________________    CC: Derm Problem (Sahara, is here for a follow up no other concerns )    HPI:  Ms. Sahara Whiteside is a(n) 66 year old female who presents today as a return patient for dermatitis    Rash - taking methotrexate as needed  - in winter - goes ~2 weeks between doses  - start itching around bra strap which prompts subsequent doses  -  recent labs okay  - had COVID-19 in January - fever x2 days then coughing, fatigue lasted about 3 weeks    Patient is otherwise feeling well, without additional skin concerns.    Labs Reviewed:  2/28/22 CBC, CMP unremarkable    Physical Exam:  Vitals: There were no vitals taken for this visit.  SKIN: no photos  - No other lesions of concern on areas examined.     Medications:  Current Outpatient Medications   Medication     folic acid (FOLVITE) 1 MG tablet     letrozole (FEMARA) 2.5 MG tablet     methotrexate sodium 2.5 MG TABS     No current facility-administered medications for this visit.      Past Medical/Surgical History:   Patient Active Problem List   Diagnosis     Dermatitis     History reviewed. No pertinent past medical history.    CC Aleida Arellano MD  [facility]  [address] [place] [address] on close of this encounter.

## 2022-06-16 ENCOUNTER — TELEPHONE (OUTPATIENT)
Dept: DERMATOLOGY | Facility: CLINIC | Age: 67
End: 2022-06-16
Payer: COMMERCIAL

## 2022-06-16 NOTE — TELEPHONE ENCOUNTER
Attempted to reach patient to schedule their follow-up with Dermatology.   I left the patient a voicemail to call the clinic for scheduling.     Janina Franco, Virtual Visit Facilitator

## 2022-09-09 ENCOUNTER — DOCUMENTATION ONLY (OUTPATIENT)
Dept: LAB | Facility: CLINIC | Age: 67
End: 2022-09-09

## 2022-09-09 DIAGNOSIS — D84.9 IMMUNOSUPPRESSION (H): Primary | ICD-10-CM

## 2022-09-09 NOTE — PROGRESS NOTES
This patient has an upcoming appointment with you and would like to come in to lab first. Please place any orders you would like, thank you. Lab staff. If you so not have any labs for this patient, please forward this to someone to reschedule her. Thank you.

## 2022-09-13 ENCOUNTER — LAB (OUTPATIENT)
Dept: LAB | Facility: CLINIC | Age: 67
End: 2022-09-13
Payer: COMMERCIAL

## 2022-09-13 DIAGNOSIS — D84.9 IMMUNOSUPPRESSION (H): ICD-10-CM

## 2022-09-13 LAB
ALBUMIN SERPL-MCNC: 3.8 G/DL (ref 3.4–5)
ALP SERPL-CCNC: 95 U/L (ref 40–150)
ALT SERPL W P-5'-P-CCNC: 28 U/L (ref 0–50)
ANION GAP SERPL CALCULATED.3IONS-SCNC: 10 MMOL/L (ref 3–14)
AST SERPL W P-5'-P-CCNC: 39 U/L (ref 0–45)
BASOPHILS # BLD AUTO: 0 10E3/UL (ref 0–0.2)
BASOPHILS NFR BLD AUTO: 1 %
BILIRUB SERPL-MCNC: 1.2 MG/DL (ref 0.2–1.3)
BUN SERPL-MCNC: 14 MG/DL (ref 7–30)
CALCIUM SERPL-MCNC: 9.6 MG/DL (ref 8.5–10.1)
CHLORIDE BLD-SCNC: 110 MMOL/L (ref 94–109)
CO2 SERPL-SCNC: 20 MMOL/L (ref 20–32)
CREAT SERPL-MCNC: 0.64 MG/DL (ref 0.52–1.04)
EOSINOPHIL # BLD AUTO: 0.3 10E3/UL (ref 0–0.7)
EOSINOPHIL NFR BLD AUTO: 5 %
ERYTHROCYTE [DISTWIDTH] IN BLOOD BY AUTOMATED COUNT: 13.3 % (ref 10–15)
GFR SERPL CREATININE-BSD FRML MDRD: >90 ML/MIN/1.73M2
GLUCOSE BLD-MCNC: 104 MG/DL (ref 70–99)
HCT VFR BLD AUTO: 44.5 % (ref 35–47)
HGB BLD-MCNC: 14.9 G/DL (ref 11.7–15.7)
LYMPHOCYTES # BLD AUTO: 1.6 10E3/UL (ref 0.8–5.3)
LYMPHOCYTES NFR BLD AUTO: 26 %
MCH RBC QN AUTO: 31.4 PG (ref 26.5–33)
MCHC RBC AUTO-ENTMCNC: 33.5 G/DL (ref 31.5–36.5)
MCV RBC AUTO: 94 FL (ref 78–100)
MONOCYTES # BLD AUTO: 0.6 10E3/UL (ref 0–1.3)
MONOCYTES NFR BLD AUTO: 9 %
NEUTROPHILS # BLD AUTO: 3.7 10E3/UL (ref 1.6–8.3)
NEUTROPHILS NFR BLD AUTO: 60 %
PLATELET # BLD AUTO: 204 10E3/UL (ref 150–450)
POTASSIUM BLD-SCNC: 4.7 MMOL/L (ref 3.4–5.3)
PROT SERPL-MCNC: 7.8 G/DL (ref 6.8–8.8)
RBC # BLD AUTO: 4.74 10E6/UL (ref 3.8–5.2)
SODIUM SERPL-SCNC: 140 MMOL/L (ref 133–144)
WBC # BLD AUTO: 6.2 10E3/UL (ref 4–11)

## 2022-09-13 PROCEDURE — 80053 COMPREHEN METABOLIC PANEL: CPT

## 2022-09-13 PROCEDURE — 36415 COLL VENOUS BLD VENIPUNCTURE: CPT

## 2022-09-13 PROCEDURE — 85025 COMPLETE CBC W/AUTO DIFF WBC: CPT

## 2022-09-18 ENCOUNTER — HEALTH MAINTENANCE LETTER (OUTPATIENT)
Age: 67
End: 2022-09-18

## 2022-09-19 ENCOUNTER — VIRTUAL VISIT (OUTPATIENT)
Dept: DERMATOLOGY | Facility: CLINIC | Age: 67
End: 2022-09-19
Payer: COMMERCIAL

## 2022-09-19 DIAGNOSIS — L30.9 DERMATITIS: ICD-10-CM

## 2022-09-19 PROCEDURE — 99214 OFFICE O/P EST MOD 30 MIN: CPT | Mod: 95 | Performed by: DERMATOLOGY

## 2022-09-19 RX ORDER — METHOTREXATE 2.5 MG/1
15 TABLET ORAL WEEKLY
Qty: 30 TABLET | Refills: 4 | Status: SHIPPED | OUTPATIENT
Start: 2022-09-19 | End: 2022-10-11

## 2022-09-19 RX ORDER — FOLIC ACID 1 MG/1
1 TABLET ORAL DAILY
Qty: 90 TABLET | Refills: 3 | Status: SHIPPED | OUTPATIENT
Start: 2022-09-19 | End: 2023-09-07

## 2022-09-19 RX ORDER — CETIRIZINE HYDROCHLORIDE 5 MG/1
5 TABLET ORAL DAILY
COMMUNITY

## 2022-09-19 NOTE — NURSING NOTE
Chief Complaint   Patient presents with     Telephone     6 month follow up dermatitis    Teledermatology Nurse Call Patients:     Are you in the [facility] at the time of the encounter? yes    Today's visit will be billed to you and your insurance.    A teledermatology visit is not as thorough as an in-person visit and the quality of the photograph sent may not be of the same quality as that taken by the dermatology clinic.    Pt states they have no new breaks outs or anything to take photos of that is why she hasn't and isn't uploading anything for today's apt.   Tess Lewis  9/19/22

## 2022-09-19 NOTE — PROGRESS NOTES
[facility] Dermatology Note  Encounter Date: Sep 19, 2022  Telephone ([phone]). Location of teledermatologist: [facility] DERMATOLOGY CLINIC [place]. Start time: 8:11. End time: 8:21.    Dermatology Problem List:  Last FBSE 12/6/21  1. 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 Sicca symptoms: rheumatology referral  3. Folliculitis    ____________________________________________    Assessment & Plan:     1. Eczematous dermatitis: doing well with occasional flares which respond well to methotrexate. Given infrequency of use, will try to further extend interval between dosing and attempt discontinuation. We briefly discuss naltrexone use for pruritus.  - increase interval between dosing of methotrexate with goal to discontinue  - folic acid 1 mg daily  - CBC, CMP in 6 months  - topicals PRN    Procedures Performed:    None    Follow-up: 1 year    Staff:     Humberto Bergman MD, FAAD   of Dermatology  Department of Dermatology  [facility]    ____________________________________________    CC: Telephone (6 month follow up dermatitis )    HPI:  Ms. Sahara Whiteside is a(n) 66 year old female who presents today as a return patient for eczematous dermatitis    Eczematous dermatitis - has gone >1 month without methotrexate  - can usually tell when outbreak is coming - preceding itching  - typically ~3 weeks between doses - usually doses more frequently during the winter    Had recent PET scan at [facility] - reportedly normal    Patient is otherwise feeling well, without additional skin concerns.    Labs Reviewed:  9/2022 CBC, CMP unremarkable    Physical Exam:  Vitals: There were no vitals taken for this  visit.  SKIN: no photos    Medications:  Current Outpatient Medications   Medication     cetirizine (ZYRTEC) 5 MG tablet     folic acid (FOLVITE) 1 MG tablet     letrozole (FEMARA) 2.5 MG tablet     methotrexate sodium 2.5 MG TABS     Vitamin D3 (CHOLECALCIFEROL) 125 MCG (5000 UT) tablet     No current facility-administered medications for this visit.      Past Medical/Surgical History:   Patient Active Problem List   Diagnosis     Dermatitis     No past medical history on file.    CC Aleida Arellano MD  [facility]  [address] [place] [address] on close of this encounter.

## 2022-09-19 NOTE — LETTER
9/19/2022       RE: Sahara Whiteside  [address] [address]     Dear Colleague,    Thank you for referring your patient, Sahara Whiteside, to the [facility] DERMATOLOGY CLINIC [place] at [facility]. Please see a copy of my visit note below.    [facility] Dermatology Note  Encounter Date: Sep 19, 2022  Telephone ([phone]). Location of teledermatologist: [facility] DERMATOLOGY CLINIC [place]. Start time: 8:11. End time: 8:21.    Dermatology Problem List:  Last FBSE 12/6/21  1. 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 Sicca symptoms: rheumatology referral  3. Folliculitis    ____________________________________________    Assessment & Plan:     1. Eczematous dermatitis: doing well with occasional flares which respond well to methotrexate. Given infrequency of use, will try to further extend interval between dosing and attempt discontinuation. We briefly discuss naltrexone use for pruritus.  - increase interval between dosing of methotrexate with goal to discontinue  - folic acid 1 mg daily  - CBC, CMP in 6 months  - topicals PRN    Procedures Performed:    None    Follow-up: 1 year    Staff:     Humberto Bergman MD, FAAD   of Dermatology  Department of Dermatology  [facility]    ____________________________________________    CC: Telephone (6 month follow up dermatitis )    HPI:  Ms. Sahara Whiteside is a(n) 66 year old female who presents today as a return patient for eczematous dermatitis    Eczematous dermatitis - has gone >1 month without methotrexate  - can usually tell when outbreak is coming - preceding itching  - typically ~3 weeks  between doses - usually doses more frequently during the winter    Had recent PET scan at [facility] - reportedly normal    Patient is otherwise feeling well, without additional skin concerns.    Labs Reviewed:  9/2022 CBC, CMP unremarkable    Physical Exam:  Vitals: There were no vitals taken for this visit.  SKIN: no photos    Medications:  Current Outpatient Medications   Medication     cetirizine (ZYRTEC) 5 MG tablet     folic acid (FOLVITE) 1 MG tablet     letrozole (FEMARA) 2.5 MG tablet     methotrexate sodium 2.5 MG TABS     Vitamin D3 (CHOLECALCIFEROL) 125 MCG (5000 UT) tablet     No current facility-administered medications for this visit.      Past Medical/Surgical History:   Patient Active Problem List   Diagnosis     Dermatitis     No past medical history on file.    CC Aleida Arellano MD  [facility]  [address] [place] [address] on close of this encounter.

## 2022-11-07 NOTE — MR AVS SNAPSHOT
After Visit Summary   2018    Sahara Whiteside    MRN: [id number]           Patient Information     Date Of Birth          1955        Visit Information        Provider Department      2018 6:30 PM Humberto Bergman MD [facility] Dermatology        Care Instructions    Methotrexate - ask oncologist if okay for your rash in context of breast cancer  Narrow-band UVB phototherapy - 2-3 times weekly (look on American Academy of Dermatology website - aad.org)  Cetirizine 10 mg - can take 2 in the morning and 2 at night  Clobetasol - twice daily to affected areas  Vanicream  Fax biopsy reports          Follow-ups after your visit        Your next 10 appointments already scheduled     Dec 05, 2018  2:00 PM CST   (Arrive by 1:45 PM)   Return Visit with MD ASHUTOSH [facility] Dermatology ([facility])    [address] [place] [id number] [phone]              Who to contact     Please call your clinic at [phone] to:    Ask questions about your health    Make or cancel appointments    Discuss your medicines    Learn about your test results    Speak to your doctor            Additional Information About Your Visit        MyChart Information     Octapolyt is an electronic gateway that provides easy, online access to your medical records. With Fandium, you can request a clinic appointment, read your test results, renew a prescription or communicate with your care team.     To sign up for Octapolyt visit the website at [email or web address]   You will be asked to enter the access code listed below, as well as some personal information. Please follow the directions to create your username and password.     Your access code is: [email or web address]  Expires: 2019  5:31 AM     Your access code will  in 90 days. If you need help or a new code, please contact your [facility] or call [phone] for  assistance.        Care EveryWhere ID     This is your Care EveryWhere ID. This could be used by other organizations to access your [facility] medical records  [id number]        Your Vitals Were     Pulse                   76            Blood Pressure from Last 3 Encounters:   11/07/18 123/80    Weight from Last 3 Encounters:   No data found for Wt              Today, you had the following     No orders found for display       Primary Care Provider    None Specified       No primary provider on file.        Equal Access to Services     [facility]: Hadii aad ku hadasho Soamparoali, waaxda luqadaha, qaybta kaalmada adeegyada, waxay rabiain hayjudyn terrell myrtlejudson laveronica . So [facility] [phone].    ATENCIÓN: Si habla español, tiene a fierro disposición servicios gratuitos de asistencia lingüística. Tracinai al [phone].    We comply with applicable federal civil rights laws and [place] laws. We do not discriminate on the basis of race, color, national origin, age, disability, sex, sexual orientation, or gender identity.            Thank you!     Thank you for choosing [facility] DERMATOLOGY  for your care. Our goal is always to provide you with excellent care. Hearing back from our patients is one way we can continue to improve our services. Please take a few minutes to complete the written survey that you may receive in the mail after your visit with us. Thank you!             Your Updated Medication List - Protect others around you: Learn how to safely use, store and throw away your medicines at www.disposemymeds.org.          This list is accurate as of 11/7/18  6:45 PM.  Always use your most recent med list.                   Brand Name Dispense Instructions for use Diagnosis    letrozole 2.5 MG tablet    FEMARA             Cheek-To-Nose Interpolation Flap Text: A decision was made to reconstruct the defect utilizing an interpolation axial flap and a staged reconstruction.  A telfa template was made of the defect.  This telfa template was then used to outline the Cheek-To-Nose Interpolation flap.  The donor area for the pedicle flap was then injected with anesthesia.  The flap was excised through the skin and subcutaneous tissue down to the layer of the underlying musculature.  The interpolation flap was carefully excised within this deep plane to maintain its blood supply.  The edges of the donor site were undermined.   The donor site was closed in a primary fashion.  The pedicle was then rotated into position and sutured.  Once the tube was sutured into place, adequate blood supply was confirmed with blanching and refill.  The pedicle was then wrapped with xeroform gauze and dressed appropriately with a telfa and gauze bandage to ensure continued blood supply and protect the attached pedicle.

## 2023-05-07 ENCOUNTER — HEALTH MAINTENANCE LETTER (OUTPATIENT)
Age: 68
End: 2023-05-07

## 2023-09-06 ENCOUNTER — TELEPHONE (OUTPATIENT)
Dept: DERMATOLOGY | Facility: CLINIC | Age: 68
End: 2023-09-06
Payer: COMMERCIAL

## 2023-09-07 ENCOUNTER — VIRTUAL VISIT (OUTPATIENT)
Dept: DERMATOLOGY | Facility: CLINIC | Age: 68
End: 2023-09-07
Payer: COMMERCIAL

## 2023-09-07 DIAGNOSIS — L30.9 DERMATITIS: Primary | ICD-10-CM

## 2023-09-07 DIAGNOSIS — D84.9 IMMUNOSUPPRESSION (H): ICD-10-CM

## 2023-09-07 PROCEDURE — 99213 OFFICE O/P EST LOW 20 MIN: CPT | Mod: 95 | Performed by: DERMATOLOGY

## 2023-09-07 RX ORDER — FOLIC ACID 1 MG/1
1 TABLET ORAL DAILY
Qty: 90 TABLET | Refills: 3 | Status: SHIPPED | OUTPATIENT
Start: 2023-09-07

## 2023-09-07 RX ORDER — METHOTREXATE 2.5 MG/1
15 TABLET ORAL WEEKLY
Qty: 30 TABLET | Refills: 4 | Status: SHIPPED | OUTPATIENT
Start: 2023-09-07

## 2023-09-07 ASSESSMENT — PAIN SCALES - GENERAL: PAINLEVEL: NO PAIN (0)

## 2023-09-07 NOTE — LETTER
9/7/2023       RE: Sahara Whiteside  [address] [address]     Dear Colleague,    Thank you for referring your patient, Sahara Whiteside, to the [facility] DERMATOLOGY CLINIC [place] at [facility]. Please see a copy of my visit note below.    [facility] Dermatology Note  Encounter Date: Sep 7, 2023  Store-and-Forward and Telephone ([phone]). Location of teledermatologist: [facility] DERMATOLOGY CLINIC [place].  Start time: 12:58. End time: 1:02.    Dermatology Problem List:  Last FBSE 12/6/21  1. 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 Sicca symptoms: rheumatology referral  3. Folliculitis    ____________________________________________    Assessment & Plan:     Dermatitis: overall doing very well and only had 2 consecutive weeks of methotrexate use following flare triggered by arthropod bite.   - methotrexate 15 mg weekly PRN dosing with folate  - topicals PRN    Procedures Performed:    None    Follow-up: 1 year    Staff:     Humberto Bergman MD, FAAD   of Dermatology  Department of Dermatology  [facility]    ____________________________________________    CC: RECHECK (Annual Dermatitis )    HPI:  Ms. Sahara Whiteside is a(n) 67 year old female who presents today as a return patient for rash    Stopped methotrexate last September  - got bug bite this July - became very reactive  - restarted methotrexate 15 mg weekly x2 weeks  - not needing topicals    Patient is otherwise feeling well, without additional skin concerns.    Labs Reviewed:  9/2022 CBC, CMP unremarkable    Physical Exam:  Vitals: There were no  vitals taken for this visit.  SKIN: Teledermatology photos were reviewed; image quality and interpretability: acceptable. Image date: 9/6/23.  - skin clear  - No other lesions of concern on areas examined.     Medications:  Current Outpatient Medications   Medication    cetirizine (ZYRTEC) 5 MG tablet    folic acid (FOLVITE) 1 MG tablet    letrozole (FEMARA) 2.5 MG tablet    methotrexate sodium 2.5 MG TABS    Vitamin D3 (CHOLECALCIFEROL) 125 MCG (5000 UT) tablet     No current facility-administered medications for this visit.      Past Medical/Surgical History:   Patient Active Problem List   Diagnosis    Dermatitis     No past medical history on file.    CC Aleida Arellano MD  [facility]  [address] [place] [address] on close of this encounter.

## 2023-09-07 NOTE — NURSING NOTE
Chief Complaint   Patient presents with    RECHECK     Annual Dermatitis      Teledermatology Nurse Call Patients:     Are you in the [facility] at the time of the encounter? yes    Today's visit will be billed to you and your insurance.    A teledermatology visit is not as thorough as an in-person visit and the quality of the photograph sent may not be of the same quality as that taken by the dermatology clinic.    Shelby Kocher

## 2023-09-07 NOTE — PROGRESS NOTES
[facility] Dermatology Note  Encounter Date: Sep 7, 2023  Store-and-Forward and Telephone ([phone]). Location of teledermatologist: [facility] DERMATOLOGY CLINIC [place].  Start time: 12:58. End time: 1:02.    Dermatology Problem List:  Last FBSE 12/6/21  1. 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 Sicca symptoms: rheumatology referral  3. Folliculitis    ____________________________________________    Assessment & Plan:     Dermatitis: overall doing very well and only had 2 consecutive weeks of methotrexate use following flare triggered by arthropod bite.   - methotrexate 15 mg weekly PRN dosing with folate  - topicals PRN    Procedures Performed:    None    Follow-up: 1 year    Staff:     Humberto Bergman MD, FAAD   of Dermatology  Department of Dermatology  [facility]    ____________________________________________    CC: RECHECK (Annual Dermatitis )    HPI:  Ms. Sahara Whiteside is a(n) 67 year old female who presents today as a return patient for rash    Stopped methotrexate last September  - got bug bite this July - became very reactive  - restarted methotrexate 15 mg weekly x2 weeks  - not needing topicals    Patient is otherwise feeling well, without additional skin concerns.    Labs Reviewed:  9/2022 CBC, CMP unremarkable    Physical Exam:  Vitals: There were no vitals taken for this visit.  SKIN: Teledermatology photos were reviewed; image quality and interpretability: acceptable. Image date: 9/6/23.  - skin clear  - No other lesions of concern on areas examined.     Medications:  Current Outpatient Medications   Medication    cetirizine (ZYRTEC) 5 MG tablet    folic acid  (FOLVITE) 1 MG tablet    letrozole (FEMARA) 2.5 MG tablet    methotrexate sodium 2.5 MG TABS    Vitamin D3 (CHOLECALCIFEROL) 125 MCG (5000 UT) tablet     No current facility-administered medications for this visit.      Past Medical/Surgical History:   Patient Active Problem List   Diagnosis    Dermatitis     No past medical history on file.    CC Aleida Arellano MD  [facility]  [address] [place] [address] on close of this encounter.

## 2024-07-14 ENCOUNTER — HEALTH MAINTENANCE LETTER (OUTPATIENT)
Age: 69
End: 2024-07-14

## 2025-06-28 ENCOUNTER — HEALTH MAINTENANCE LETTER (OUTPATIENT)
Age: 70
End: 2025-06-28

## 2025-07-19 ENCOUNTER — HEALTH MAINTENANCE LETTER (OUTPATIENT)
Age: 70
End: 2025-07-19